# Patient Record
Sex: FEMALE | Race: WHITE | NOT HISPANIC OR LATINO | Employment: PART TIME
[De-identification: names, ages, dates, MRNs, and addresses within clinical notes are randomized per-mention and may not be internally consistent; named-entity substitution may affect disease eponyms.]

---

## 2017-09-17 ENCOUNTER — HEALTH MAINTENANCE LETTER (OUTPATIENT)
Age: 32
End: 2017-09-17

## 2019-11-08 ENCOUNTER — HEALTH MAINTENANCE LETTER (OUTPATIENT)
Age: 34
End: 2019-11-08

## 2020-02-23 ENCOUNTER — HEALTH MAINTENANCE LETTER (OUTPATIENT)
Age: 35
End: 2020-02-23

## 2020-03-02 ENCOUNTER — ANCILLARY PROCEDURE (OUTPATIENT)
Dept: GENERAL RADIOLOGY | Facility: CLINIC | Age: 35
End: 2020-03-02
Attending: NURSE PRACTITIONER
Payer: COMMERCIAL

## 2020-03-02 ENCOUNTER — OFFICE VISIT (OUTPATIENT)
Dept: FAMILY MEDICINE | Facility: CLINIC | Age: 35
End: 2020-03-02
Payer: COMMERCIAL

## 2020-03-02 VITALS
TEMPERATURE: 98 F | WEIGHT: 150.9 LBS | SYSTOLIC BLOOD PRESSURE: 100 MMHG | DIASTOLIC BLOOD PRESSURE: 67 MMHG | HEIGHT: 69 IN | BODY MASS INDEX: 22.35 KG/M2 | OXYGEN SATURATION: 98 % | HEART RATE: 87 BPM

## 2020-03-02 DIAGNOSIS — S59.902A INJURY OF LEFT ELBOW, INITIAL ENCOUNTER: ICD-10-CM

## 2020-03-02 DIAGNOSIS — S50.02XA CONTUSION OF LEFT ELBOW, INITIAL ENCOUNTER: Primary | ICD-10-CM

## 2020-03-02 DIAGNOSIS — M25.552 HIP PAIN, LEFT: ICD-10-CM
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
## 2020-03-02 PROCEDURE — 73080 X-RAY EXAM OF ELBOW: CPT | Mod: LT | Performed by: RADIOLOGY

## 2020-03-02 PROCEDURE — 99203 OFFICE O/P NEW LOW 30 MIN: CPT | Performed by: NURSE PRACTITIONER

## 2020-03-02 ASSESSMENT — ENCOUNTER SYMPTOMS
ARTHRALGIAS: 1
VOMITING: 0
MYALGIAS: 1
NAUSEA: 0
WOUND: 1
NUMBNESS: 0

## 2020-03-02 ASSESSMENT — MIFFLIN-ST. JEOR: SCORE: 1448.86

## 2020-03-03 NOTE — PROGRESS NOTES
"SUBJECTIVE:   April Toure is a 34 year old female presenting with a chief complaint of   Chief Complaint   Patient presents with     Elbow Pain     slipped on ice this afternoon landed on left elbow/side        She is a new patient of [facility].    MS Injury/Pain    Onset of symptoms was this morning.  Location: left elbow  Context:       The injury happened while walking on the sidewalk      Mechanism: fell on ice, hit left elbow and hip      Patient experienced immediate pain, immediate swelling  Course of symptoms is worsening.    Severity moderate  Current and Associated symptoms: Pain, Swelling and Tenderness  Denies  Bruising, Warmth and Redness  Aggravating Factors: pulling up towards her and extending to long.   Therapies to improve symptoms include: made a sling for herself, ibuprofen.   This is the first time this type of problem has occurred for this patient.   Denies hitting head.     Review of Systems   Gastrointestinal: Negative for nausea and vomiting.   Musculoskeletal: Positive for arthralgias and myalgias.   Skin: Positive for wound.   Neurological: Negative for numbness.       History reviewed. No pertinent past medical history.  Family History   Problem Relation Age of Onset     Cancer Mother         Pancreatic     Cancer Paternal Grandmother         stomach     Heart Disease Maternal Grandfather         MI     Current Outpatient Medications   Medication Sig Dispense Refill     Norethin-Eth Estrad-Fe Biphas (LO LOESTRIN FE PO) Take  by mouth.       Social History     Tobacco Use     Smoking status: Never Smoker     Smokeless tobacco: Never Used   Substance Use Topics     Alcohol use: Yes     Comment: occ       OBJECTIVE  /67   Pulse 87   Temp 98  F (36.7  C) (Oral)   Ht 1.753 m (5' 9\")   Wt 68.4 kg (150 lb 14.4 oz)   SpO2 98%   BMI 22.28 kg/m      Physical Exam  Vitals signs and nursing note reviewed.   Constitutional:       Appearance: Normal appearance. She is well-developed and " well-groomed.   Musculoskeletal:      Left elbow: She exhibits decreased range of motion and swelling. Tenderness found. Olecranon process tenderness noted. No radial head, no medial epicondyle and no lateral epicondyle tenderness noted.      Left hip: She exhibits tenderness and swelling. She exhibits normal range of motion.   Skin:     General: Skin is warm and dry.      Capillary Refill: Capillary refill takes less than 2 seconds.      Findings: Bruising (left hip) present.   Neurological:      Mental Status: She is alert and oriented to person, place, and time.      GCS: GCS eye subscore is 4. GCS verbal subscore is 5. GCS motor subscore is 6.   Psychiatric:         Behavior: Behavior is cooperative.         X-Ray was done, my findings are: no acute fractures or dislocations noted to the left elbow xray.     ASSESSMENT:      ICD-10-CM    1. Contusion of left elbow, initial encounter S50.02XA    2. Injury of left elbow, initial encounter S59.902A XR Elbow Left G/E 3 Views   3. Fall, initial encounter W19.XXXA    4. Hip pain, left M25.552         Medical Decision Making:    Differential Diagnosis:  MS Injury Pain: sprain, fracture, muscle strain, contusion and dislocation    Serious Comorbid Conditions:  Adult:  None    PLAN:  Discussed with patient do not feel as though we need to do a hip xray given history and exam. Will do a left elbow xray to ensure no fracture. Xray showed no acute fractures or dislocations. Favor a left elbow contusion v sprain. Educated they are treated very similarly. Symptomatic treatment recommendations discussed. Education was added to AVS. Patient was agreeable to plan and verbalized understanding.      Followup:    If not improving in 1 week or if condition worsens, follow up with your Primary Care Provider    Patient Instructions     Tylenol and ibuprofen to help with pain  Ice to help with pain and swelling      RICE    RICE stands for rest, ice, compression, and elevation. Doing  these things helps limit pain and swelling after an injury. RICE also helps injuries heal faster. Use RICE for sprains, strains, and severe bruises or bumps. Follow the tips on this handout and begin RICE as soon as possible after an injury.  Rest  Pain is your body s way of telling you to rest an injured area. Whether you have hurt an elbow, hand, foot, or knee, limiting its use will prevent further injury and help you heal.  Ice  Applying ice right after an injury helps prevent swelling and reduce pain. Don t place ice directly on your skin.    Wrap a cold pack or bag of ice in a thin cloth. Place it over the injured area.    Ice for 10 minutes every 3 hours. Don t ice for more than 20 minutes at a time.  Compression  Putting pressure (compression) on an injury helps prevent swelling and provides support.    Wrap the injured area firmly with an elastic bandage. If your hand or foot tingles, becomes discolored, or feels cold to the touch, the bandage may be too tight. Rewrap it more loosely.    If your bandage becomes too loose, rewrap it.    Do not wear an elastic bandage overnight.  Elevation  Keeping an injury elevated helps reduce swelling, pain, and throbbing. Elevation is most effective when the injury is kept elevated higher than the heart.     Call your healthcare provider if you notice any of the following:    Fingers or toes feel numb, are cold to the touch, or change color.    Skin looks shiny or tight.    Pain, swelling, or bruising worsens and is not improved with elevation.   Date Last Reviewed: 9/3/2015    1466-9816 [employer]. [address] [place] [address], [place] [address]. All rights reserved. This information is not intended as a substitute for professional medical care. Always follow your healthcare professional's instructions.    How does ice massage therapy provide pain relief?   Ice massages can help provide relief for sore joints and muscles in a number of ways, including:  Ice  "application slows the inflammation and swelling that occurs after injury. Most pain is accompanied by some type of inflammation, and addressing the inflammation helps reduce the pain.   Ice massage therapy numbs sore tissues (providing pain relief like a local anesthetic).  Ice massage therapy slows the nerve impulses in the area, which interrupts the pain-spasm reaction between the nerves.  Ice massage therapy decreases tissue damage.  When does ice massage therapy work the best?  Ice massage therapy is most effective if it is applied as soon as possible after the injury occurs. The cold makes the veins in the tissue contract, reducing circulation. Once the cold is removed, the veins overcompensate and dilate and blood rushes into the area. The blood brings with it the necessary nutrients to allow the injured muscles, ligaments and tendons to heal.      Ice Massage Guidelines:  You can use an ice cube, but it's easier to use an \"ice cup\" for ice massage.  To make an ice cup, fill a small paper or Styrofoam cup about two-thirds full, and freeze it until it is solid.  To use the ice cup, peel off the top of the cup so about  Half  inch of ice is showing. The remaining part of the cup is for you to hold on to.    Keep the ice cup moving (to avoid an ice burn)!   As the ice melts, it will drip, so put a small towel under the area you are icing.    Rub the ice in small circles all over the affected area. Avoid going directly over areas where the bone is close to the skin, such as right over your knee cap, the point of your elbow, or your spine. Try to work just around these bony areas to target the joint space.    If the ice melts down so the cup is touching your skin, peel more of the cup off.    Continue for only 5 to 7 minutes. The area will feel cold at first, then it will ache, then finally become numb. Your skin will be pink and cold when you finish.    You can do an ice massage several times a day if it helps " you.

## 2020-03-03 NOTE — PATIENT INSTRUCTIONS
Tylenol and ibuprofen to help with pain  Ice to help with pain and swelling      RICE    RICE stands for rest, ice, compression, and elevation. Doing these things helps limit pain and swelling after an injury. RICE also helps injuries heal faster. Use RICE for sprains, strains, and severe bruises or bumps. Follow the tips on this handout and begin RICE as soon as possible after an injury.  Rest  Pain is your body s way of telling you to rest an injured area. Whether you have hurt an elbow, hand, foot, or knee, limiting its use will prevent further injury and help you heal.  Ice  Applying ice right after an injury helps prevent swelling and reduce pain. Don t place ice directly on your skin.    Wrap a cold pack or bag of ice in a thin cloth. Place it over the injured area.    Ice for 10 minutes every 3 hours. Don t ice for more than 20 minutes at a time.  Compression  Putting pressure (compression) on an injury helps prevent swelling and provides support.    Wrap the injured area firmly with an elastic bandage. If your hand or foot tingles, becomes discolored, or feels cold to the touch, the bandage may be too tight. Rewrap it more loosely.    If your bandage becomes too loose, rewrap it.    Do not wear an elastic bandage overnight.  Elevation  Keeping an injury elevated helps reduce swelling, pain, and throbbing. Elevation is most effective when the injury is kept elevated higher than the heart.     Call your healthcare provider if you notice any of the following:    Fingers or toes feel numb, are cold to the touch, or change color.    Skin looks shiny or tight.    Pain, swelling, or bruising worsens and is not improved with elevation.   Date Last Reviewed: 9/3/2015    [id number] [employer]. [address] [place] [address], [place] [address]. All rights reserved. This information is not intended as a substitute for professional medical care. Always follow your healthcare professional's instructions.    How does  "ice massage therapy provide pain relief?   Ice massages can help provide relief for sore joints and muscles in a number of ways, including:  Ice application slows the inflammation and swelling that occurs after injury. Most pain is accompanied by some type of inflammation, and addressing the inflammation helps reduce the pain.   Ice massage therapy numbs sore tissues (providing pain relief like a local anesthetic).  Ice massage therapy slows the nerve impulses in the area, which interrupts the pain-spasm reaction between the nerves.  Ice massage therapy decreases tissue damage.  When does ice massage therapy work the best?  Ice massage therapy is most effective if it is applied as soon as possible after the injury occurs. The cold makes the veins in the tissue contract, reducing circulation. Once the cold is removed, the veins overcompensate and dilate and blood rushes into the area. The blood brings with it the necessary nutrients to allow the injured muscles, ligaments and tendons to heal.      Ice Massage Guidelines:  You can use an ice cube, but it's easier to use an \"ice cup\" for ice massage.  To make an ice cup, fill a small paper or Styrofoam cup about two-thirds full, and freeze it until it is solid.  To use the ice cup, peel off the top of the cup so about  Half  inch of ice is showing. The remaining part of the cup is for you to hold on to.    Keep the ice cup moving (to avoid an ice burn)!   As the ice melts, it will drip, so put a small towel under the area you are icing.    Rub the ice in small circles all over the affected area. Avoid going directly over areas where the bone is close to the skin, such as right over your knee cap, the point of your elbow, or your spine. Try to work just around these bony areas to target the joint space.    If the ice melts down so the cup is touching your skin, peel more of the cup off.    Continue for only 5 to 7 minutes. The area will feel cold at first, then it will " ache, then finally become numb. Your skin will be pink and cold when you finish.    You can do an ice massage several times a day if it helps you.

## 2020-03-16 ENCOUNTER — OFFICE VISIT (OUTPATIENT)
Dept: FAMILY MEDICINE | Facility: CLINIC | Age: 35
End: 2020-03-16
Payer: COMMERCIAL

## 2020-03-16 VITALS
WEIGHT: 148.7 LBS | BODY MASS INDEX: 22.02 KG/M2 | TEMPERATURE: 97.4 F | HEIGHT: 69 IN | DIASTOLIC BLOOD PRESSURE: 70 MMHG | OXYGEN SATURATION: 99 % | SYSTOLIC BLOOD PRESSURE: 98 MMHG | HEART RATE: 80 BPM

## 2020-03-16 DIAGNOSIS — M25.522 LEFT ELBOW PAIN: Primary | ICD-10-CM

## 2020-03-16 DIAGNOSIS — T14.8XXA HEMATOMA: ICD-10-CM

## 2020-03-16 PROCEDURE — 99213 OFFICE O/P EST LOW 20 MIN: CPT | Performed by: PHYSICIAN ASSISTANT

## 2020-03-16 ASSESSMENT — MIFFLIN-ST. JEOR: SCORE: 1438.88

## 2020-03-16 NOTE — PROGRESS NOTES
"SUBJECTIVE:  April is a 34 year old female who presents to urgent care with continued left elbow pain.  She was seen after a fall when she slipped on ice 2 weeks ago.  She has had improvement of range of motion and pain but still has some residual swelling and pain with terminal flexion and extension of the left elbow.  Is still swollen.  She denies any other fevers, chills and feels otherwise well  Chief Complaint   Patient presents with     Elbow Pain     left elbow      ROS: See HPI    Current Outpatient Medications   Medication     Norethin-Eth Estrad-Fe Biphas (LO LOESTRIN FE PO)     No current facility-administered medications for this visit.       Patient Active Problem List   Diagnosis     CARDIOVASCULAR SCREENING; LDL GOAL LESS THAN 160        No past medical history on file.  No past surgical history on file.  Family History   Problem Relation Age of Onset     Cancer Mother         Pancreatic     Cancer Paternal Grandmother         stomach     Heart Disease Maternal Grandfather         MI     Social History     Tobacco Use     Smoking status: Never Smoker     Smokeless tobacco: Never Used   Substance Use Topics     Alcohol use: Yes     Comment: occ        OBJECTIVE:  BP 98/70   Pulse 80   Temp 97.4  F (36.3  C) (Oral)   Ht 1.753 m (5' 9\")   Wt 67.4 kg (148 lb 11.2 oz)   SpO2 99%   BMI 21.96 kg/m       GENERAL APPEARANCE: healthy, alert and no distress     EYES: EOMI, - PERRL     HENT: ear canals and TM's normal and nose and mouth without ulcers or lesions     RESP: lungs clear to auscultation - no rales, rhonchi or wheezes     MS:  No obvious deformity shoulder dislocation of the left elbow.  Good range of motion except unable to fully extend or flex elbow.  Swelling proximal to olecranon process likely hematoma.  No bruising or discoloration.  Full strength.  No pain with supination or pronation.  Neurovascular intact     SKIN: no suspicious lesions or rashes     NEURO: Alert and oriented x3.  No " focal deficits.  Upper and lower strength 5/5     PSYCH: mentation appears normal. and affect normal/bright      ASSESSMENT/PLAN:  April was seen today for elbow pain.    Diagnoses and all orders for this visit:    Left elbow pain  -     ORTHOPEDICS ADULT REFERRAL    Hematoma    Patient is likely experiencing pain with terminal flexion extension due to residual hematoma.  Overall she is improving just not as fast as she would like.  Discussed expected timeframes of hematoma and soft tissue injury and can last several weeks.  Restart icing, take ibuprofen for pain and apply heat to the hematoma.  Orthopedic referral given if patient is not improving with these measures.  No x-rays needed today    The plan of care was discussed with the patient. They understand and agree with the course of treatment prescribed. A printed summary was given including instructions and medications.    Herminio West PA-C

## 2020-03-16 NOTE — PATIENT INSTRUCTIONS
Patient Education     Hematoma  A hematoma is a collection of blood trapped outside of a blood vessel. It is what we think of as a bruise or a contusion. It is usually seen under the skin as a black and blue spot on your arm or leg, or a bump on your head after an injury. It can be almost anywhere on or in your body. It can also occur in an internal organ where it can be more serious.  A hematoma is caused by an injury with damage to small blood vessels. This causes blood to leak into the tissues. Blood forms a pocket under the skin that swells and looks like a purplish patch. Hematomas sometimes form under the skin from bleeding during childbirth and can be particularly serious. Another serious form of hematoma forms after a fall on the head, called a subdural hematoma.  Gradually the blood in the hematoma is absorbed back into the body. The swelling and pain of the hematoma will go away. This takes from 1 to 4 weeks, depending on the size of the hematoma. The skin over the hematoma may turn bluish then brown and yellow as the blood is dissolved and absorbed. Usually, this only takes a couple of weeks but can last months.  Home care    Limit motion of the joints near the hematoma. If the hematoma is large and painful, avoid sports and other vigorous physical activity until the swelling and pain goes away.    Apply an ice pack (ice cubes in a plastic bag, or a frozen bag of peas, wrapped in a thin towel) over the injured area for 20 minutes every 1 to 2 hours the first day. Continue with ice packs 3 to 4 times a day for the next 2 days. Continue the use of ice packs for relief of pain and swelling as needed.    If you need anything for pain, you can take acetaminophen, unless you were given a different pain medicine to use. Talk with your healthcare provider before using this medicine if you have chronic liver or kidney disease. Also talk with your healthcare provider if you have had a stomach ulcer or digestive  tract bleeding, or are taking blood-thinner medicines.  Follow-up care  Follow up with your healthcare provider, or as advised. If X-rays or a CT scan were done, you will be notified if there is a change in the reading, especially if it affects treatment.  When to seek medical advice  Call your healthcare provider right away if any of the following occur:    Redness around the hematoma    Increase in pain or warmth in the hematoma    Increase in size of the hematoma    Fever of 100.4 F (38 C) or higher, or as directed by your healthcare provider    If the hematoma is on the arm or leg, watch for:  ? Increased swelling or pain in the extremity  ? Numbness or tingling or blue color of the hand or foot  Date Last Reviewed: 4/1/2018 2000-2019 [employer] [address] [place] [address], [place] [address]. All rights reserved. This information is not intended as a substitute for professional medical care. Always follow your healthcare professional's instructions.

## 2020-12-06 ENCOUNTER — HEALTH MAINTENANCE LETTER (OUTPATIENT)
Age: 35
End: 2020-12-06

## 2021-06-09 ENCOUNTER — TELEPHONE (OUTPATIENT)
Dept: OTOLARYNGOLOGY | Facility: CLINIC | Age: 36
End: 2021-06-09

## 2021-06-09 ENCOUNTER — OFFICE VISIT (OUTPATIENT)
Dept: URGENT CARE | Facility: URGENT CARE | Age: 36
End: 2021-06-09
Payer: COMMERCIAL

## 2021-06-09 VITALS
TEMPERATURE: 98.4 F | WEIGHT: 150 LBS | HEIGHT: 69 IN | BODY MASS INDEX: 22.22 KG/M2 | OXYGEN SATURATION: 97 % | SYSTOLIC BLOOD PRESSURE: 110 MMHG | DIASTOLIC BLOOD PRESSURE: 60 MMHG | RESPIRATION RATE: 15 BRPM | HEART RATE: 58 BPM

## 2021-06-09 DIAGNOSIS — R42 VERTIGO: Primary | ICD-10-CM

## 2021-06-09 PROCEDURE — 99214 OFFICE O/P EST MOD 30 MIN: CPT | Performed by: INTERNAL MEDICINE

## 2021-06-09 RX ORDER — MECLIZINE HYDROCHLORIDE 25 MG/1
25 TABLET ORAL 3 TIMES DAILY PRN
Qty: 30 TABLET | Refills: 0 | Status: SHIPPED | OUTPATIENT
Start: 2021-06-09 | End: 2022-11-11

## 2021-06-09 ASSESSMENT — ENCOUNTER SYMPTOMS
PARESTHESIAS: 0
FACIAL ASYMMETRY: 0
TROUBLE SWALLOWING: 0
WEAKNESS: 0
FEVER: 0
SPEECH DIFFICULTY: 0

## 2021-06-09 ASSESSMENT — MIFFLIN-ST. JEOR: SCORE: 1439.78

## 2021-06-09 NOTE — TELEPHONE ENCOUNTER
[facility] Call Center    Phone Message    May a detailed message be left on voicemail: no     Reason for Call: Appointment Intake    Referring Provider Name: Rupa Cabrera MD in  URGENT CARE  Diagnosis and/or Symptoms: Vertigo    Action Taken: Message routed to:  [facility] ([facility]): [facility]- [[id number]]    Travel Screening: Not Applicable

## 2021-06-09 NOTE — PROGRESS NOTES
"ASSESSMENT AND PLAN:      ICD-10-CM    1. Vertigo  R42 OTOLARYNGOLOGY REFERRAL     meclizine (ANTIVERT) 25 MG tablet       Differential Diagnosis:  Vertigo, labyrinthitis, Ménière's, stroke vertebrobasilar lesion  Serious Comorbid Conditions:  Adult:  None    PLAN:      Patient Instructions   Vertigo  No signs/symptoms of stroke    Unclear etiology of vertigo.  Question if allergy related whether barometric pressure or temperature changes changes.  No obvious viral symptoms at this time    Otolaryngology referral to request Epley maneuver  Meclizine up to 3 x day as needed.    Fluids  Rest.  Avoid driving.    If symptoms persist 1 month, then see Otolaryngology     Return in about 2 days (around 6/11/2021).    Rupa Cabrera MD  [facility] URGENT CARE    Subjective     April Toure is a 35 year old who presents for Patient presents with:  Urgent Care  Dizziness: vertigo x 3 days. Had it 2 weekends ago as well.     an established patient of [facility].    Adult    Onset of symptoms was 3 day(s) ago.  Sudden onset while sitting at home knitting.  Vertigo symptoms described, \"room spinning\"  \"like drunk\" - lasted 1.5 hours.  Next day at work, had symptoms, aggravated by bending & standing  Exacerbated by moving head  Not affected driving  Touching walls when walking  Treatment measures tried include Fluids.  Predisposing factors include - no recent illness  has seasonal allergies with stuffy ears.        Review of Systems   Constitutional: Negative for fever.        Feels warm   HENT: Negative for hearing loss, tinnitus and trouble swallowing.    Eyes: Negative for visual disturbance.   Neurological: Negative for facial asymmetry, speech difficulty, weakness (no focal weakness) and paresthesias.        No sx CVA           Objective    /60   Pulse 58   Temp 98.4  F (36.9  C) (Oral)   Resp 15   Ht 1.753 m (5' 9\")   Wt 68 kg (150 lb)   LMP 06/02/2021   SpO2 97%   Breastfeeding No   BMI " 22.15 kg/m    Physical Exam  Vitals signs reviewed.   Constitutional:       General: She is not in acute distress.     Appearance: Normal appearance. She is not ill-appearing, toxic-appearing or diaphoretic.   HENT:      Right Ear: Tympanic membrane normal.      Left Ear: Tympanic membrane normal.      Ears:      Comments:  finger rub finger hearing bilaterally     Nose: Nose normal.      Mouth/Throat:      Mouth: Mucous membranes are moist.   Eyes:      Extraocular Movements: Extraocular movements intact.      Conjunctiva/sclera: Conjunctivae normal.      Pupils: Pupils are equal, round, and reactive to light.      Comments: No nystagmus   Cardiovascular:      Rate and Rhythm: Normal rate and regular rhythm.      Pulses: Normal pulses.      Heart sounds: Normal heart sounds.   Pulmonary:      Effort: Pulmonary effort is normal.      Breath sounds: Normal breath sounds.   Neurological:      General: No focal deficit present.      Mental Status: She is alert. Mental status is at baseline.      Cranial Nerves: No cranial nerve deficit.      Sensory: No sensory deficit.      Motor: No weakness.      Gait: Gait normal.      Deep Tendon Reflexes: Reflexes normal.   Psychiatric:         Mood and Affect: Mood normal.

## 2021-06-09 NOTE — PATIENT INSTRUCTIONS
Vertigo  No signs/symptoms of stroke    Unclear etiology of vertigo.  Question if allergy related whether barometric pressure or temperature changes changes.  No obvious viral symptoms at this time    Otolaryngology referral to request Epley maneuver  Meclizine up to 3 x day as needed.    Fluids  Rest.  Avoid driving.    If symptoms persist 1 month, then see Otolaryngology

## 2021-09-25 ENCOUNTER — HEALTH MAINTENANCE LETTER (OUTPATIENT)
Age: 36
End: 2021-09-25

## 2022-01-15 ENCOUNTER — HEALTH MAINTENANCE LETTER (OUTPATIENT)
Age: 37
End: 2022-01-15

## 2022-07-05 ENCOUNTER — OFFICE VISIT (OUTPATIENT)
Dept: URGENT CARE | Facility: URGENT CARE | Age: 37
End: 2022-07-05
Payer: COMMERCIAL

## 2022-07-05 VITALS
OXYGEN SATURATION: 99 % | TEMPERATURE: 98.2 F | DIASTOLIC BLOOD PRESSURE: 72 MMHG | SYSTOLIC BLOOD PRESSURE: 119 MMHG | HEIGHT: 69 IN | HEART RATE: 90 BPM | WEIGHT: 160 LBS | BODY MASS INDEX: 23.7 KG/M2

## 2022-07-05 DIAGNOSIS — R14.0 BLOATING SYMPTOM: Primary | ICD-10-CM

## 2022-07-05 DIAGNOSIS — R10.84 ABDOMINAL PAIN, GENERALIZED: ICD-10-CM

## 2022-07-05 LAB
BASOPHILS # BLD AUTO: 0.1 10E3/UL (ref 0–0.2)
BASOPHILS NFR BLD AUTO: 0 %
EOSINOPHIL # BLD AUTO: 0.2 10E3/UL (ref 0–0.7)
EOSINOPHIL NFR BLD AUTO: 2 %
ERYTHROCYTE [DISTWIDTH] IN BLOOD BY AUTOMATED COUNT: 12 % (ref 10–15)
HCT VFR BLD AUTO: 43.2 % (ref 35–47)
HGB BLD-MCNC: 15 G/DL (ref 11.7–15.7)
IMM GRANULOCYTES # BLD: 0 10E3/UL
IMM GRANULOCYTES NFR BLD: 0 %
LYMPHOCYTES # BLD AUTO: 2.1 10E3/UL (ref 0.8–5.3)
LYMPHOCYTES NFR BLD AUTO: 18 %
MCH RBC QN AUTO: 30.8 PG (ref 26.5–33)
MCHC RBC AUTO-ENTMCNC: 34.7 G/DL (ref 31.5–36.5)
MCV RBC AUTO: 89 FL (ref 78–100)
MONOCYTES # BLD AUTO: 0.8 10E3/UL (ref 0–1.3)
MONOCYTES NFR BLD AUTO: 6 %
NEUTROPHILS # BLD AUTO: 8.9 10E3/UL (ref 1.6–8.3)
NEUTROPHILS NFR BLD AUTO: 74 %
PLATELET # BLD AUTO: 311 10E3/UL (ref 150–450)
RBC # BLD AUTO: 4.87 10E6/UL (ref 3.8–5.2)
WBC # BLD AUTO: 12.1 10E3/UL (ref 4–11)

## 2022-07-05 PROCEDURE — 85025 COMPLETE CBC W/AUTO DIFF WBC: CPT | Performed by: FAMILY MEDICINE

## 2022-07-05 PROCEDURE — 36415 COLL VENOUS BLD VENIPUNCTURE: CPT | Performed by: FAMILY MEDICINE

## 2022-07-05 PROCEDURE — 99214 OFFICE O/P EST MOD 30 MIN: CPT | Performed by: FAMILY MEDICINE

## 2022-07-05 RX ORDER — LORATADINE 10 MG/1
10 TABLET ORAL DAILY
COMMUNITY

## 2022-07-05 NOTE — PROGRESS NOTES
Chief Complaint   Patient presents with     Urgent Care     Gas     Bloated     Nausea     Pt in clinic to have eval for bloating, gas and nausea.       ASSESMENT AND PLAN   April was seen today for urgent care, gas, bloated and nausea.    Diagnoses and all orders for this visit:    Bloating symptom  -     XR Abdomen 2 Views  -     Enteric Bacteria and Virus Panel by HEATHER Stool; Future  -     Cancel: STOOL CX O&P INFORMATION  -     Ova and Parasite Exam Routine; Future    Abdominal pain, generalized  -     CBC with platelets and differential; Future  -     CBC with platelets and differential        Discussed with pt to try beano to help with the symptoms   If symptoms worsen and with slight elevated wbc should follow up in ER       Initial differential diagnosis included but was not restricted to   Differential Diagnosis:  Abdominal Pain: Constipation, GB/Cholelithiasis, IBS, UTI, Pyelonephritis, Abdominal Wall, Bowel Obstruction, Pancreatitis, Hepatitis, Non Specific and Viral Gastroenteritis  Medical Decision Making:  Patient has has normal bowel movements regularly and also bowel sounds are normal I am not concerned about acute bowel obstruction.  Symptoms  could be from intestinal infection.  Recommended getting a stool test done.  Follow up in ER tomorrow or tonight if discomfort worsens     Routine discharge counseling was given to the patient and the patient understands that worsening, changing or persistent symptoms should prompt an immediate call or follow up with their primary physician or the emergency department. The importance of appropriate follow up was also discussed with the patient.     I have reviewed the nursing notes.  Review of the result(s) of each unique test -   X-Ray was done, my findings are: no sign of bowel obstruction     Time  spent on the date of the encounter doing chart review, review of test results, interpretation of tests, patient visit and documentation   see orders in Epic  Pt  verbalized and agreed with the plan and is aware of the worsening symptoms for which would need to follow up .  Pt was stable during time of discharge from the clinic     SUBJECTIVE     April Toure is a 37 year old female presenting with a chief complaint of    Chief Complaint   Patient presents with     Urgent Care     Gas     Bloated     Nausea     Pt in clinic to have eval for bloating, gas and nausea.     Abdominal Pain    Location: suprapubic and periumbilical   Radiation: None.    Pain character: dull,   Duration: 3 day(s)   Course of Illness: stable.  Exacerbated by: recumbency  Relieved by: nothing.  Associated Symptoms: nausea and bloating.  sHe also has been noticing several bowel movements which are normal with no mucus or blood in the stool.  Female : Not applicable  Surgical History: appendectomy            Past Medical History:   Diagnosis Date     Fatty liver      Current Outpatient Medications   Medication Sig Dispense Refill     bismuth subsalicylate (PEPTO BISMOL) 262 MG/15ML suspension Take 15 mLs by mouth every 6 hours as needed for indigestion       loratadine (CLARITIN) 10 MG tablet Take 10 mg by mouth daily       Multiple Vitamin (MULTIVITAMIN ADULT PO)        Probiotic Product (PROBIOTIC DAILY PO)        meclizine (ANTIVERT) 25 MG tablet Take 1 tablet (25 mg) by mouth 3 times daily as needed for dizziness (Patient not taking: Reported on 7/5/2022) 30 tablet 0     Norethin-Eth Estrad-Fe Biphas (LO LOESTRIN FE PO) Take  by mouth. (Patient not taking: Reported on 7/5/2022)       Social History     Tobacco Use     Smoking status: Never Smoker     Smokeless tobacco: Never Used   Substance Use Topics     Alcohol use: Yes     Comment: occ     Family History   Problem Relation Age of Onset     Cancer Mother         Pancreatic     Cancer Paternal Grandmother         stomach     Heart Disease Maternal Grandfather         MI         ROS:    10 point ROS of systems including Constitutional, Eyes,  "Respiratory, Cardiovascular,  Genitourinary, Integumentary, Muscularskeletal, Psychiatric ,neurological were all negative except for pertinent positives noted in my HPI         OBJECTIVE:    /72   Pulse 90   Temp 98.2  F (36.8  C) (Temporal)   Ht 1.753 m (5' 9\")   Wt 72.6 kg (160 lb)   LMP 06/21/2022   SpO2 99%   BMI 23.63 kg/m    GENERAL APPEARANCE: healthy, alert and no distress  EYES: EOMI,  PERRL, conjunctiva clear  mouth without ulcers, erythema or lesions  CV: regular rates and rhythm, normal S1 S2, no murmur noted  ABDOMEN:  soft, nontender,distended  no HSM or masses and bowel sounds normal  PSYCH: mentation appears normal  Physical Exam      (Note was completed, in part, with SHADOW voice-recognition software. Documentation reviewed, but some grammatical, spelling, and word errors may remain.)  Celine Minaya MD                  "

## 2022-07-06 ENCOUNTER — LAB (OUTPATIENT)
Dept: LAB | Facility: CLINIC | Age: 37
End: 2022-07-06
Payer: COMMERCIAL

## 2022-07-06 ENCOUNTER — TELEPHONE (OUTPATIENT)
Dept: URGENT CARE | Facility: URGENT CARE | Age: 37
End: 2022-07-06

## 2022-07-06 DIAGNOSIS — R14.0 BLOATING SYMPTOM: ICD-10-CM

## 2022-07-06 PROCEDURE — 87209 SMEAR COMPLEX STAIN: CPT

## 2022-07-06 PROCEDURE — 87177 OVA AND PARASITES SMEARS: CPT

## 2022-07-06 PROCEDURE — 87506 IADNA-DNA/RNA PROBE TQ 6-11: CPT

## 2022-07-06 NOTE — TELEPHONE ENCOUNTER
Pt dropped off lab specimens and states she could not send a MyChart msg to Dr. Minaya.     Pt states she is not improving or getting worse. Would like to know what her next steps are. Requesting a return call [phone].

## 2022-07-07 LAB — O+P STL MICRO: NEGATIVE

## 2022-11-11 ENCOUNTER — OFFICE VISIT (OUTPATIENT)
Dept: FAMILY MEDICINE | Facility: CLINIC | Age: 37
End: 2022-11-11
Payer: COMMERCIAL

## 2022-11-11 VITALS
HEART RATE: 81 BPM | OXYGEN SATURATION: 99 % | BODY MASS INDEX: 24.73 KG/M2 | DIASTOLIC BLOOD PRESSURE: 79 MMHG | WEIGHT: 167 LBS | SYSTOLIC BLOOD PRESSURE: 127 MMHG | HEIGHT: 69 IN | TEMPERATURE: 97.3 F | RESPIRATION RATE: 16 BRPM

## 2022-11-11 DIAGNOSIS — Z12.4 CERVICAL CANCER SCREENING: ICD-10-CM

## 2022-11-11 DIAGNOSIS — Z76.89 ENCOUNTER TO ESTABLISH CARE WITH NEW DOCTOR: ICD-10-CM

## 2022-11-11 DIAGNOSIS — Z11.59 NEED FOR HEPATITIS C SCREENING TEST: ICD-10-CM

## 2022-11-11 DIAGNOSIS — L98.9 SKIN LESION: ICD-10-CM

## 2022-11-11 DIAGNOSIS — K76.0 NAFL (NONALCOHOLIC FATTY LIVER): ICD-10-CM

## 2022-11-11 DIAGNOSIS — G43.009 MIGRAINE WITHOUT AURA AND WITHOUT STATUS MIGRAINOSUS, NOT INTRACTABLE: ICD-10-CM

## 2022-11-11 DIAGNOSIS — R19.7 INTERMITTENT DIARRHEA: ICD-10-CM

## 2022-11-11 DIAGNOSIS — Z11.4 SCREENING FOR HIV (HUMAN IMMUNODEFICIENCY VIRUS): ICD-10-CM

## 2022-11-11 DIAGNOSIS — Z00.00 ROUTINE GENERAL MEDICAL EXAMINATION AT A HEALTH CARE FACILITY: Primary | ICD-10-CM

## 2022-11-11 LAB
ALBUMIN SERPL-MCNC: 3.9 G/DL (ref 3.4–5)
ALP SERPL-CCNC: 77 U/L (ref 40–150)
ALT SERPL W P-5'-P-CCNC: 51 U/L (ref 0–50)
ANION GAP SERPL CALCULATED.3IONS-SCNC: 6 MMOL/L (ref 3–14)
AST SERPL W P-5'-P-CCNC: 24 U/L (ref 0–45)
BILIRUB SERPL-MCNC: 0.4 MG/DL (ref 0.2–1.3)
BUN SERPL-MCNC: 10 MG/DL (ref 7–30)
CALCIUM SERPL-MCNC: 9 MG/DL (ref 8.5–10.1)
CHLORIDE BLD-SCNC: 107 MMOL/L (ref 94–109)
CHOLEST SERPL-MCNC: 183 MG/DL
CO2 SERPL-SCNC: 26 MMOL/L (ref 20–32)
CREAT SERPL-MCNC: 0.65 MG/DL (ref 0.52–1.04)
FASTING STATUS PATIENT QL REPORTED: YES
GFR SERPL CREATININE-BSD FRML MDRD: >90 ML/MIN/1.73M2
GLUCOSE BLD-MCNC: 86 MG/DL (ref 70–99)
HCV AB SERPL QL IA: NONREACTIVE
HDLC SERPL-MCNC: 76 MG/DL
HIV 1+2 AB+HIV1 P24 AG SERPL QL IA: NONREACTIVE
LDLC SERPL CALC-MCNC: 93 MG/DL
NONHDLC SERPL-MCNC: 107 MG/DL
POTASSIUM BLD-SCNC: 3.9 MMOL/L (ref 3.4–5.3)
PROT SERPL-MCNC: 7.7 G/DL (ref 6.8–8.8)
SODIUM SERPL-SCNC: 139 MMOL/L (ref 133–144)
TRIGL SERPL-MCNC: 71 MG/DL

## 2022-11-11 PROCEDURE — G0145 SCR C/V CYTO,THINLAYER,RESCR: HCPCS | Performed by: FAMILY MEDICINE

## 2022-11-11 PROCEDURE — 90715 TDAP VACCINE 7 YRS/> IM: CPT | Performed by: FAMILY MEDICINE

## 2022-11-11 PROCEDURE — 87624 HPV HI-RISK TYP POOLED RSLT: CPT | Performed by: FAMILY MEDICINE

## 2022-11-11 PROCEDURE — 90686 IIV4 VACC NO PRSV 0.5 ML IM: CPT | Performed by: FAMILY MEDICINE

## 2022-11-11 PROCEDURE — 90472 IMMUNIZATION ADMIN EACH ADD: CPT | Performed by: FAMILY MEDICINE

## 2022-11-11 PROCEDURE — 86003 ALLG SPEC IGE CRUDE XTRC EA: CPT | Mod: 59 | Performed by: FAMILY MEDICINE

## 2022-11-11 PROCEDURE — 80053 COMPREHEN METABOLIC PANEL: CPT | Performed by: FAMILY MEDICINE

## 2022-11-11 PROCEDURE — 86803 HEPATITIS C AB TEST: CPT | Performed by: FAMILY MEDICINE

## 2022-11-11 PROCEDURE — 86364 TISS TRNSGLTMNASE EA IG CLAS: CPT | Performed by: FAMILY MEDICINE

## 2022-11-11 PROCEDURE — 99213 OFFICE O/P EST LOW 20 MIN: CPT | Mod: 25 | Performed by: FAMILY MEDICINE

## 2022-11-11 PROCEDURE — 87389 HIV-1 AG W/HIV-1&-2 AB AG IA: CPT | Performed by: FAMILY MEDICINE

## 2022-11-11 PROCEDURE — 36415 COLL VENOUS BLD VENIPUNCTURE: CPT | Performed by: FAMILY MEDICINE

## 2022-11-11 PROCEDURE — 80061 LIPID PANEL: CPT | Performed by: FAMILY MEDICINE

## 2022-11-11 PROCEDURE — 90471 IMMUNIZATION ADMIN: CPT | Performed by: FAMILY MEDICINE

## 2022-11-11 PROCEDURE — 99395 PREV VISIT EST AGE 18-39: CPT | Mod: 25 | Performed by: FAMILY MEDICINE

## 2022-11-11 RX ORDER — SUMATRIPTAN 50 MG/1
50 TABLET, FILM COATED ORAL
Qty: 12 TABLET | Refills: 0 | Status: SHIPPED | OUTPATIENT
Start: 2022-11-11 | End: 2023-12-22

## 2022-11-11 ASSESSMENT — ENCOUNTER SYMPTOMS
PARESTHESIAS: 0
SORE THROAT: 0
HEADACHES: 0
JOINT SWELLING: 0
BREAST MASS: 0
COUGH: 0
NERVOUS/ANXIOUS: 0
SHORTNESS OF BREATH: 0
ABDOMINAL PAIN: 0
DYSURIA: 0
NAUSEA: 0
HEARTBURN: 0
HEMATURIA: 0
DIARRHEA: 0
FEVER: 0
WEAKNESS: 0
CONSTIPATION: 0
MYALGIAS: 0
FREQUENCY: 0
HEMATOCHEZIA: 0
CHILLS: 0
PALPITATIONS: 0
EYE PAIN: 0
DIZZINESS: 0
ARTHRALGIAS: 0

## 2022-11-11 NOTE — PROGRESS NOTES
SUBJECTIVE:   CC: April is an 37 year old who presents for preventive health visit.       Patient has been advised of split billing requirements and indicates understanding: Yes  Healthy Habits:     Getting at least 3 servings of Calcium per day:  Yes    Bi-annual eye exam:  Yes    Dental care twice a year:  Yes    Sleep apnea or symptoms of sleep apnea:  None    Diet:  Vegetarian/vegan    Frequency of exercise:  None    Taking medications regularly:  Yes    Medication side effects:  Not applicable    PHQ-2 Total Score: 1    Additional concerns today:  Yes  Imm/Inj  Pertinent negatives include no abdominal pain, arthralgias, chest pain, chills, congestion, coughing, fever, headaches, joint swelling, myalgias, nausea, rash, sore throat or weakness.     1. Spots on head - hurt when wears earphones.  Removed?    2. Migraines.  Since 20s.  They are stress related.  Come through back.   Vertigo - had, saw ENT.  Excedrin migraine helps.    4. Stomach issues, was in urgent care.  Diarrhea with stress.  Also recently after shellfish, could she have allergy?        Today's PHQ-2 Score:   PHQ-2 ( 1999 Pfizer) 11/11/2022   Q1: Little interest or pleasure in doing things 0   Q2: Feeling down, depressed or hopeless 1   PHQ-2 Score 1   Q1: Little interest or pleasure in doing things Not at all   Q2: Feeling down, depressed or hopeless Several days   PHQ-2 Score 1       Abuse: Current or Past (Physical, Sexual or Emotional) - No  Do you feel safe in your environment? Yes    Have you ever done Advance Care Planning? (For example, a Health Directive, POLST, or a discussion with a medical provider or your loved ones about your wishes): No, advance care planning information given to patient to review.  Patient plans to discuss their wishes with loved ones or provider.      Social History     Tobacco Use     Smoking status: Never     Smokeless tobacco: Never   Substance Use Topics     Alcohol use: Yes     Comment: occ          Alcohol Use 11/11/2022   Prescreen: >3 drinks/day or >7 drinks/week? No       Reviewed orders with patient.  Reviewed health maintenance and updated orders accordingly - Yes      Breast Cancer Screening:    Breast CA Risk Assessment (FHS-7) 11/11/2022   Do you have a family history of breast, colon, or ovarian cancer? No / Unknown         Patient under 40 years of age: Routine Mammogram Screening not recommended.   Pertinent mammograms are reviewed under the imaging tab.    History of abnormal Pap smear: NO - age 30-65 PAP every 5 years with negative HPV co-testing recommended     Reviewed and updated as needed this visit by clinical staff   Tobacco  Allergies  Meds  Problems  Med Hx  Surg Hx  Fam Hx  Soc   Hx        Reviewed and updated as needed this visit by Provider   Tobacco  Allergies  Meds  Problems  Med Hx  Surg Hx  Fam Hx             Review of Systems   Constitutional: Negative for chills and fever.   HENT: Negative for congestion, ear pain, hearing loss and sore throat.    Eyes: Negative for pain and visual disturbance.   Respiratory: Negative for cough and shortness of breath.    Cardiovascular: Negative for chest pain, palpitations and peripheral edema.   Gastrointestinal: Negative for abdominal pain, constipation, diarrhea, heartburn, hematochezia and nausea.   Breasts:  Negative for tenderness, breast mass and discharge.   Genitourinary: Negative for dysuria, frequency, genital sores, hematuria, pelvic pain, urgency, vaginal bleeding and vaginal discharge.   Musculoskeletal: Negative for arthralgias, joint swelling and myalgias.   Skin: Negative for rash.   Neurological: Negative for dizziness, weakness, headaches and paresthesias.   Psychiatric/Behavioral: Negative for mood changes. The patient is not nervous/anxious.           OBJECTIVE:   /79 (BP Location: Right arm, Patient Position: Sitting, Cuff Size: Adult Regular)   Pulse 81   Temp 97.3  F (36.3  C) (Tympanic)    "Resp 16   Ht 1.753 m (5' 9\")   Wt 75.8 kg (167 lb)   SpO2 99%   BMI 24.66 kg/m    Physical Exam  GENERAL: healthy, alert and no distress  EYES: Eyes grossly normal to inspection, PERRL and conjunctivae and sclerae normal  HENT: ear canals and TM's normal, nose and mouth without ulcers or lesions  NECK: no adenopathy, no asymmetry, masses, or scars and thyroid normal to palpation  RESP: lungs clear to auscultation - no rales, rhonchi or wheezes  CV: regular rate and rhythm, normal S1 S2, no S3 or S4, no murmur, click or rub, no peripheral edema and peripheral pulses strong  ABDOMEN: soft, nontender, no hepatosplenomegaly, no masses and bowel sounds normal   (female): normal female external genitalia, normal urethral meatus, vaginal mucosa pink, moist, well rugated, and normal cervix/adnexa/uterus without masses or discharge  MS: no gross musculoskeletal defects noted, no edema  SKIN: no suspicious lesions or rashes  NEURO: Normal strength and tone, mentation intact and speech normal  PSYCH: mentation appears normal, affect normal/bright    Diagnostic Test Results:  Labs reviewed in Epic    ASSESSMENT/PLAN:   April was seen today for physical, gyn exam and imm/inj.    Diagnoses and all orders for this visit:    Routine general medical examination at a health care facility    Mammo: start at 40    Pap: done today    Colon: start at 45    Immunizations: Will update FLU and TDAP today    Discussed healthy lifestyle and aging recommendations including regular exercise, adequate and regular sleep, 5+ fruits and veggies daily.  -     Lipid panel reflex to direct LDL Non-fasting; Future  -     Lipid panel reflex to direct LDL Non-fasting    Encounter to establish care with new doctor    New to me today; reviewed and updated past medical history and problem list and allergies via chart review ([facility], Care Everywhere) and with patient.    Screening for HIV (human immunodeficiency virus)  -     HIV Antigen Antibody " Combo; Future  -     HIV Antigen Antibody Combo    Need for hepatitis C screening test  -     Hepatitis C Screen Reflex to HCV RNA Quant and Genotype; Future  -     Hepatitis C Screen Reflex to HCV RNA Quant and Genotype    Cervical cancer screening  -     Pap Screen with HPV - recommended age 30 - 65 years    Skin lesion  Comments:    Top of head, would like referral to derm for removal    Done today  Orders:  -     Adult Dermatology Referral; Future    Intermittent diarrhea  Comments:    Check labs for shellfish allergy and gluten    If normal - suspect reaction to stress/anxiety (which is what pt suspects as well)    let us know if worsens  Orders:  -     Allergen clam IgE; Future  -     Allergen crab IgE; Future  -     Allergen lobster IgE; Future  -     Allergen oyster IgE; Future  -     Allergen scallop IgE; Future  -     Allergen shrimp IgE; Future  -     Tissue transglutaminase colten IgA and IgG; Future  -     Allergen clam IgE  -     Allergen crab IgE  -     Allergen lobster IgE  -     Allergen oyster IgE  -     Allergen scallop IgE  -     Allergen shrimp IgE  -     Tissue transglutaminase colten IgA and IgG    NAFL (nonalcoholic fatty liver)  Comments:    Dx'd at outside clinic    Has improved with diet changes (Mediterranean diet)    Recheck LFTs today  Orders:  -     COMPREHENSIVE METABOLIC PANEL; Future  -     COMPREHENSIVE METABOLIC PANEL    Migraine without aura and without status migrainosus, not intractable  Comments:    Trial of Imitrex    Will be helpful diagnostically and also may improve HA resolution    Follow up via MyC message after tries it let us know if helpful or no  Orders:  -     SUMAtriptan (IMITREX) 50 MG tablet; Take 1 tablet (50 mg) by mouth at onset of headache for migraine May repeat in 2 hours. Max 4 tablets/24 hours.    Other orders  -     REVIEW OF HEALTH MAINTENANCE PROTOCOL ORDERS  -     TDAP VACCINE (Adacel, Boostrix)  -     INFLUENZA VACCINE IM > 6 MONTHS VALENT IIV4  "(AFLURIA/FLUZONE)        Patient has been advised of split billing requirements and indicates understanding: Yes      COUNSELING:  Reviewed preventive health counseling, as reflected in patient instructions    Estimated body mass index is 24.66 kg/m  as calculated from the following:    Height as of this encounter: 1.753 m (5' 9\").    Weight as of this encounter: 75.8 kg (167 lb).        She reports that she has never smoked. She has never used smokeless tobacco.      Counseling Resources:  ATP IV Guidelines  Pooled Cohorts Equation Calculator  Breast Cancer Risk Calculator  BRCA-Related Cancer Risk Assessment: FHS-7 Tool  FRAX Risk Assessment  ICSI Preventive Guidelines  Dietary Guidelines for Americans, 2010  USDA's MyPlate  ASA Prophylaxis  Lung CA Screening    Nichol Montano MD  [facility]  "

## 2022-11-11 NOTE — NURSING NOTE
Prior to immunization administration, verified patients identity using patient s name and date of birth. Please see Immunization Activity for additional information.     Screening Questionnaire for Adult Immunization    Are you sick today?   No   Do you have allergies to medications, food, a vaccine component or latex?   No   Have you ever had a serious reaction after receiving a vaccination?   No   Do you have a long-term health problem with heart, lung, kidney, or metabolic disease (e.g., diabetes), asthma, a blood disorder, no spleen, complement component deficiency, a cochlear implant, or a spinal fluid leak?  Are you on long-term aspirin therapy?   No   Do you have cancer, leukemia, HIV/AIDS, or any other immune system problem?   No   Do you have a parent, brother, or sister with an immune system problem?   No   In the past 3 months, have you taken medications that affect  your immune system, such as prednisone, other steroids, or anticancer drugs; drugs for the treatment of rheumatoid arthritis, Crohn s disease, or psoriasis; or have you had radiation treatments?   No   Have you had a seizure, or a brain or other nervous system problem?   No   During the past year, have you received a transfusion of blood or blood    products, or been given immune (gamma) globulin or antiviral drug?   No   For women: Are you pregnant or is there a chance you could become       pregnant during the next month?   No   Have you received any vaccinations in the past 4 weeks?   No     Immunization questionnaire answers were all negative.        Per orders of Dr. Montano, injection of TDap (Adacel) given by Deidra Virk MA. Patient instructed to remain in clinic for 15 minutes afterwards, and to report any adverse reaction to me immediately.       Screening performed by Deidra Virk MA on 11/11/2022 at 12:50 PM.

## 2022-11-14 LAB
TTG IGA SER-ACNC: 0.6 U/ML
TTG IGG SER-ACNC: <0.6 U/ML

## 2022-11-15 LAB
BKR LAB AP GYN ADEQUACY: NORMAL
BKR LAB AP GYN INTERPRETATION: NORMAL
BKR LAB AP HPV REFLEX: NORMAL
BKR LAB AP PREVIOUS ABNORMAL: NORMAL
CLAM IGE QN: <0.1 KU(A)/L
CRAB IGE QN: <0.1 KU(A)/L
LOBSTER IGE QN: <0.1 KU(A)/L
OYSTER IGE QN: <0.1 KU(A)/L
PATH REPORT.COMMENTS IMP SPEC: NORMAL
PATH REPORT.COMMENTS IMP SPEC: NORMAL
PATH REPORT.RELEVANT HX SPEC: NORMAL
SCALLOP IGE QN: <0.1 KU(A)/L
SHRIMP IGE QN: <0.1 KU(A)/L

## 2022-11-17 LAB
HUMAN PAPILLOMA VIRUS 16 DNA: NEGATIVE
HUMAN PAPILLOMA VIRUS 18 DNA: NEGATIVE
HUMAN PAPILLOMA VIRUS FINAL DIAGNOSIS: NORMAL
HUMAN PAPILLOMA VIRUS OTHER HR: NEGATIVE

## 2023-02-28 ENCOUNTER — E-VISIT (OUTPATIENT)
Dept: FAMILY MEDICINE | Facility: CLINIC | Age: 38
End: 2023-02-28
Payer: COMMERCIAL

## 2023-02-28 DIAGNOSIS — R05.9 COUGH, UNSPECIFIED TYPE: Primary | ICD-10-CM

## 2023-02-28 PROCEDURE — 99421 OL DIG E/M SVC 5-10 MIN: CPT | Performed by: FAMILY MEDICINE

## 2023-03-01 NOTE — PATIENT INSTRUCTIONS
Dear April,      Based on your responses, you may have COVID-19.     Will I be tested for COVID-19?  We would like to test you for COVID. I have placed orders for this test.     To schedule: go to your [facility] home page and scroll down to the section that says  You have an appointment that needs to be scheduled  and click the large green button that says  Schedule Now  and follow the steps to find the next available openings.    If you are unable to complete these [facility] [facility] scheduling steps, please call [phone] to schedule your testing.     How do I self-isolate?  You isolate when you have symptoms of COVID or a test shows you have COVID, even if you don t have symptoms.     If you DO have symptoms:  o Stay home and away from others  - For at least 5 days after your symptoms started, AND   - You are fever free for 24 hours (with no medicine that reduces fever), AND  - Your other symptoms are better.  o Wear a mask for 10 full days any time you are around others.    If you DON T have symptoms:  o Stay at home and away from others for at least 5 days after your positive test.  o Wear a mask for 10 full days any time you are around others.    How can I take care of myself?  Over the counter medications may help with your symptoms such as runny or stuffy nose, cough, chills, or fever.  Talk to your care team about your options.     Some people are at high risk of severe illness (for example, you have a weak immune system, you re 50 years or older, or you have certain medical problems). If your risk is high and your symptoms started in the last 5 days, we strongly recommend for you to get COVID treatment as soon as possible.There are safe and effective medicines available that can make you feel better faster, and prevent hospitalization and death.       To discuss COVID treatment you can:    Call your clinic OR [phone] ([phone]) and ask to speak to a nurse about a positive COVID test.    Send a  "ContractRoom message to your care team. In ContractRoom, select \"Ask a Medical Question\" then \"Do I need an appointment\" and put \"COVID\" in the subject line. Please include a phone number to call you back in the message.               Get lots of rest. Drink extra fluids (unless a doctor has told you not to)    Take Tylenol (acetaminophen) or ibuprofen for fever or pain. If you have liver or kidney problems, ask your family doctor if it's okay to take Tylenol or ibuprofen    Take over the counter medications for your symptoms, as directed by your doctor. You may also talk to your pharmacist.      If you have other health problems (like cancer, heart failure, an organ transplant or severe kidney disease): Call your specialty clinic if you don't feel better in the next 2 days.    Know when to call 911. Emergency warning signs include:  o Trouble breathing or shortness of breath  o Pain or pressure in the chest that doesn't go away  o Feeling confused like you haven't felt before, or not being able to wake up  o Bluish-colored lips or face    Where can I get more information?    [facility] - About COVID-19: [email or web address]     CDC - What to Do If You're Sick: https://www.cdc.gov/coronavirus/2019-ncov/if-you-are-sick/index.html     CDC -  Isolation https://www.cdc.gov/coronavirus/2019-ncov/your-health/isolation.html  "

## 2023-03-02 ENCOUNTER — LAB (OUTPATIENT)
Dept: LAB | Facility: CLINIC | Age: 38
End: 2023-03-02
Attending: FAMILY MEDICINE
Payer: COMMERCIAL

## 2023-03-02 DIAGNOSIS — R05.9 COUGH, UNSPECIFIED TYPE: ICD-10-CM

## 2023-03-02 LAB
FLUAV AG SPEC QL IA: NEGATIVE
FLUBV AG SPEC QL IA: NEGATIVE
SARS-COV-2 RNA RESP QL NAA+PROBE: NEGATIVE

## 2023-03-02 PROCEDURE — 87804 INFLUENZA ASSAY W/OPTIC: CPT | Performed by: FAMILY MEDICINE

## 2023-03-02 PROCEDURE — U0005 INFEC AGEN DETEC AMPLI PROBE: HCPCS

## 2023-03-02 PROCEDURE — U0003 INFECTIOUS AGENT DETECTION BY NUCLEIC ACID (DNA OR RNA); SEVERE ACUTE RESPIRATORY SYNDROME CORONAVIRUS 2 (SARS-COV-2) (CORONAVIRUS DISEASE [COVID-19]), AMPLIFIED PROBE TECHNIQUE, MAKING USE OF HIGH THROUGHPUT TECHNOLOGIES AS DESCRIBED BY CMS-2020-01-R: HCPCS

## 2023-03-13 ENCOUNTER — NURSE TRIAGE (OUTPATIENT)
Dept: FAMILY MEDICINE | Facility: CLINIC | Age: 38
End: 2023-03-13
Payer: COMMERCIAL

## 2023-03-13 DIAGNOSIS — U07.1 INFECTION DUE TO 2019 NOVEL CORONAVIRUS: Primary | ICD-10-CM

## 2023-03-13 PROCEDURE — 99207 PR NO CHARGE LOS: CPT | Performed by: FAMILY MEDICINE

## 2023-03-13 NOTE — TELEPHONE ENCOUNTER
RN COVID TREATMENT VISIT  03/13/23      The patient has been triaged and does not require a higher level of care.    pAril Toure  37 year old  Current weight? 165    Has the patient been seen by a primary care provider at an [facility] within the past two years? Yes.   Have you been in close proximity to/do you have a known exposure to a person with a confirmed case of influenza? No.     General treatment eligibility:  Date of positive COVID test (PCR or at home)?  3/13/2023    Are you or have you been hospitalized for this COVID-19 infection? No.   Have you received monoclonal antibodies or antiviral treatment for COVID-19 since this positive test? No.   Do you have any of the following conditions that place you at risk of being very sick from COVID-19?   - Chronic liver diseases such as alcoholic liver disease, autoimmune hepatitis, cirrhosis, non-alcoholic fatty liver   - Overweight or Obesity (BMI >85th percentile or BMI 25 or higher)  Yes, patient has at least one high risk condition as noted above.     Current COVID symptoms:   - fever or chills  - cough  - fatigue  - muscle or body aches  - headache  - sore throat  - congestion or runny nose  Yes. Patient has at least one symptom as selected.     How many days since symptoms started? 5 days or less. Established patient, 12 years or older weighing at least 88.2 lbs, who has symptoms that started in the past 5 days, has not been hospitalized nor received treatment already, and is at risk for being very sick from COVID-19.     Treatment eligibility by RN:    Are you currently pregnant or nursing? No    Do you have a clinically significant hypersensitivity to nirmatrelvir or ritonavir, or toxic epidermal necrolysis (TEN) or Jalloh-Dusty Syndrome? No    Do you have a history of hepatitis, any hepatic impairment on the Problem List (such as Child-Giles Class C, cirrhosis, fatty liver disease, alcoholic liver  disease), or was the last liver lab (hepatic panel, ALT, AST, ALK Phos, bilirubin) elevated in the past 6 months? No    Do you have any history of severe renal impairment (eGFR < 30mL/min)? No    Is patient eligible to continue?   Yes, patient meets all eligibility requirements for the RN COVID treatment (as denoted by all no responses above).     Current Outpatient Medications   Medication Sig Dispense Refill     loratadine (CLARITIN) 10 MG tablet Take 10 mg by mouth daily       Multiple Vitamin (MULTIVITAMIN ADULT PO)        SUMAtriptan (IMITREX) 50 MG tablet Take 1 tablet (50 mg) by mouth at onset of headache for migraine May repeat in 2 hours. Max 4 tablets/24 hours. 12 tablet 0       Medications from List 1 of the standing order (on medications that exclude the use of Paxlovid) that patient is taking: NONE. Is patient taking Cerulean's Wort? No  Is patient taking Osvaldo's Wort or any meds from List 1? No.   Medications from List 2 of the standing order (on meds that provider needs to adjust) that patient is taking: NONE. Is patient on any of the meds from List 2? No.   Medications from List 3 of standing order (on meds that a RN needs to adjust) that patient is taking: NONE. Is patient on any meds from List 3? No.     Paxlovid has an approximate 90% reduction in hospitalization. Paxlovid can possibly cause altered sense of taste, diarrhea (loose, watery stools), high blood pressure, muscle aches.     Would patient like a Paxlovid prescription?   Yes.   Lab Results   Component Value Date    GFRESTIMATED >90 11/11/2022       Was last eGFR reduced? No, eGFR 60 or greater/ No Result on record. Patient can receive the normal renal function dose. Paxlovid Rx sent to [facility]       Temporary change to home medications: None    All medication adjustments (holds, etc) were discussed with the patient and patient was asked to repeat back (teachback) their med adjustment.  Did patient understand med adjustment?  "No medication adjustments needed.         Reviewed the following instructions with the patient:    Paxlovid (nimatrelvir and ritonavir)    How it works  Two medicines (nirmatrelvir and ritonavir) are taken together. They stop the virus from growing. Less amount of virus is easier for your body to fight.    How to take    Medicine comes in a daily container with both medicine tablets. Take by mouth twice daily (once in the morning, once at night) for 5 days.    The number of tablets to take varies by patient.    Don't chew or break capsules. Swallow whole.    When to take  Take as soon as possible after positive COVID-19 test result, and within 5 days of your first symptoms.    Possible side effects  Can cause altered sense of taste, diarrhea (loose, watery stools), high blood pressure, muscle aches.    Ashlee Vega RN                   1. COVID-19 DIAGNOSIS: \"Who made your COVID-19 diagnosis?\" \"Was it confirmed by a positive lab test or self-test?\" If not diagnosed by a doctor (or NP/PA), ask \"Are there lots of cases (community spread) where you live?\" Note: See [facility] health department website, if unsure.      Home test  2. COVID-19 EXPOSURE: \"Was there any known exposure to COVID before the symptoms began?\" CDC Definition of close contact: within 6 feet (2 meters) for a total of 15 minutes or more over a 24-hour period.       Work Boss tested positive.  3. ONSET: \"When did the COVID-19 symptoms start?\"       Sunday, 3/12/2023.   4. WORST SYMPTOM: \"What is your worst symptom?\" (e.g., cough, fever, shortness of breath, muscle aches)      Sore throat.  5. COUGH: \"Do you have a cough?\" If Yes, ask: \"How bad is the cough?\"        Yes.  Not severe.   6. FEVER: \"Do you have a fever?\" If Yes, ask: \"What is your temperature, how was it measured, and when did it start?\"      Yes.   7. RESPIRATORY STATUS: \"Describe your breathing?\" (e.g., shortness of breath, wheezing, unable to speak)       No.   8. BETTER-SAME-WORSE: " "\"Are you getting better, staying the same or getting worse compared to yesterday?\"  If getting worse, ask, \"In what way?\"      Worse.   9. HIGH RISK DISEASE: \"Do you have any chronic medical problems?\" (e.g., asthma, heart or lung disease, weak immune system, obesity, etc.)      Yes.   10. VACCINE: \"Have you had the COVID-19 vaccine?\" If Yes, ask: \"Which one, how many shots, when did you get it?\"        Yes.   11. BOOSTER: \"Have you received your COVID-19 booster?\" If Yes, ask: \"Which one and when did you get it?\"        Pfizer. Yes boosters.   12. PREGNANCY: \"Is there any chance you are pregnant?\" \"When was your last menstrual period?\"        No.   13. OTHER SYMPTOMS: \"Do you have any other symptoms?\"  (e.g., chills, fatigue, headache, loss of smell or taste, muscle pain, sore throat)        Chills. Fatigue. Headache. Muscle pain. Sore throat.   14. O2 SATURATION MONITOR:  \"Do you use an oxygen saturation monitor (pulse oximeter) at home?\" If Yes, ask \"What is your reading (oxygen level) today?\" \"What is your usual oxygen saturation reading?\" (e.g., 95%)        No.      Reason for Disposition    [1] COVID-19 diagnosed by positive lab test (e.g., PCR, rapid self-test kit) AND [2] mild symptoms (e.g., cough, fever, others) AND [3] no complications or SOB    Additional Information    Negative: SEVERE difficulty breathing (e.g., struggling for each breath, speaks in single words)    Negative: Difficult to awaken or acting confused (e.g., disoriented, slurred speech)    Negative: Bluish (or gray) lips or face now    Negative: Shock suspected (e.g., cold/pale/clammy skin, too weak to stand, low BP, rapid pulse)    Negative: Sounds like a life-threatening emergency to the triager    Negative: [1] Diagnosed or suspected COVID-19 AND [2] symptoms lasting 3 or more weeks    Negative: [1] COVID-19 exposure AND [2] no symptoms    Negative: COVID-19 vaccine reaction suspected (e.g., fever, headache, muscle aches) occurring 1 to " 3 days after getting vaccine    Negative: COVID-19 vaccine, questions about    Negative: [1] Lives with someone known to have influenza (flu test positive) AND [2] flu-like symptoms (e.g., cough, runny nose, sore throat, SOB; with or without fever)    Negative: [1] Adult with possible COVID-19 symptoms AND [2] triager concerned about severity of symptoms or other causes    Negative: COVID-19 and breastfeeding, questions about    Negative: SEVERE or constant chest pain or pressure  (Exception: Mild central chest pain, present only when coughing.)    Negative: MODERATE difficulty breathing (e.g., speaks in phrases, SOB even at rest, pulse 100-120)    Negative: Headache and stiff neck (can't touch chin to chest)    Negative: Oxygen level (e.g., pulse oximetry) 90 percent or lower    Negative: Chest pain or pressure  (Exception: MILD central chest pain, present only when coughing)    Negative: Patient sounds very sick or weak to the triager    Negative: MILD difficulty breathing (e.g., minimal/no SOB at rest, SOB with walking, pulse <100)    Negative: Fever > 103 F (39.4 C)    Negative: [1] Fever > 101 F (38.3 C) AND [2] over 60 years of age    Negative: [1] Fever > 100.0 F (37.8 C) AND [2] bedridden (e.g., nursing home patient, CVA, chronic illness, recovering from surgery)    Negative: [1] HIGH RISK for severe COVID complications (e.g., weak immune system, age > 64 years, obesity with BMI of 30 or higher, pregnant, chronic lung disease or other chronic medical condition) AND [2] COVID symptoms (e.g., cough, fever)  (Exceptions: Already seen by PCP and no new or worsening symptoms.)    Negative: [1] HIGH RISK patient AND [2] influenza is widespread in the community AND [3] ONE OR MORE respiratory symptoms: cough, sore throat, runny or stuffy nose    Negative: [1] HIGH RISK patient AND [2] influenza exposure within the last 7 days AND [3] ONE OR MORE respiratory symptoms: cough, sore throat, runny or stuffy nose     Negative: Oxygen level (e.g., pulse oximetry) 91 to 94 percent    Negative: [1] COVID-19 infection suspected by caller or triager AND [2] mild symptoms (cough, fever, or others) AND [3] negative COVID-19 rapid test    Negative: Fever present > 3 days (72 hours)    Negative: [1] Fever returns after gone for over 24 hours AND [2] symptoms worse or not improved    Negative: [1] Continuous (nonstop) coughing interferes with work or school AND [2] no improvement using cough treatment per Care Advice    Negative: Cough present > 3 weeks    Protocols used: CORONAVIRUS (COVID-19) DIAGNOSED OR [id number]

## 2023-12-20 ASSESSMENT — ENCOUNTER SYMPTOMS
MYALGIAS: 0
PALPITATIONS: 0
WEAKNESS: 0
SHORTNESS OF BREATH: 0
NERVOUS/ANXIOUS: 0
DIZZINESS: 0
FREQUENCY: 0
HEARTBURN: 0
JOINT SWELLING: 0
COUGH: 0
EYE PAIN: 0
DIARRHEA: 0
NAUSEA: 0
CHILLS: 0
HEMATOCHEZIA: 0
PARESTHESIAS: 0
DYSURIA: 0
HEADACHES: 0
CONSTIPATION: 0
BREAST MASS: 0
FEVER: 0
ARTHRALGIAS: 0
ABDOMINAL PAIN: 0
HEMATURIA: 0
SORE THROAT: 0

## 2023-12-22 ENCOUNTER — OFFICE VISIT (OUTPATIENT)
Dept: FAMILY MEDICINE | Facility: CLINIC | Age: 38
End: 2023-12-22
Payer: COMMERCIAL

## 2023-12-22 VITALS
OXYGEN SATURATION: 98 % | DIASTOLIC BLOOD PRESSURE: 80 MMHG | BODY MASS INDEX: 25.89 KG/M2 | SYSTOLIC BLOOD PRESSURE: 126 MMHG | RESPIRATION RATE: 16 BRPM | HEIGHT: 69 IN | WEIGHT: 174.8 LBS | HEART RATE: 84 BPM | TEMPERATURE: 97.8 F

## 2023-12-22 DIAGNOSIS — Z00.00 ROUTINE GENERAL MEDICAL EXAMINATION AT A HEALTH CARE FACILITY: Primary | ICD-10-CM

## 2023-12-22 DIAGNOSIS — R41.840 ATTENTION DEFICIT: ICD-10-CM

## 2023-12-22 DIAGNOSIS — F51.01 PRIMARY INSOMNIA: ICD-10-CM

## 2023-12-22 LAB
ALBUMIN SERPL BCG-MCNC: 4.5 G/DL (ref 3.5–5.2)
ALP SERPL-CCNC: 71 U/L (ref 40–150)
ALT SERPL W P-5'-P-CCNC: 54 U/L (ref 0–50)
ANION GAP SERPL CALCULATED.3IONS-SCNC: 9 MMOL/L (ref 7–15)
AST SERPL W P-5'-P-CCNC: 33 U/L (ref 0–45)
BILIRUB SERPL-MCNC: 0.3 MG/DL
BUN SERPL-MCNC: 14 MG/DL (ref 6–20)
CALCIUM SERPL-MCNC: 9.5 MG/DL (ref 8.6–10)
CHLORIDE SERPL-SCNC: 102 MMOL/L (ref 98–107)
CHOLEST SERPL-MCNC: 246 MG/DL
CREAT SERPL-MCNC: 0.8 MG/DL (ref 0.51–0.95)
DEPRECATED HCO3 PLAS-SCNC: 28 MMOL/L (ref 22–29)
EGFRCR SERPLBLD CKD-EPI 2021: >90 ML/MIN/1.73M2
FASTING STATUS PATIENT QL REPORTED: YES
GLUCOSE SERPL-MCNC: 98 MG/DL (ref 70–99)
HBV SURFACE AB SERPL IA-ACNC: >1000 M[IU]/ML
HBV SURFACE AB SERPL IA-ACNC: REACTIVE M[IU]/ML
HDLC SERPL-MCNC: 77 MG/DL
LDLC SERPL CALC-MCNC: 157 MG/DL
NONHDLC SERPL-MCNC: 169 MG/DL
POTASSIUM SERPL-SCNC: 4.4 MMOL/L (ref 3.4–5.3)
PROT SERPL-MCNC: 7.7 G/DL (ref 6.4–8.3)
SODIUM SERPL-SCNC: 139 MMOL/L (ref 135–145)
TRIGL SERPL-MCNC: 60 MG/DL

## 2023-12-22 PROCEDURE — 80061 LIPID PANEL: CPT | Performed by: FAMILY MEDICINE

## 2023-12-22 PROCEDURE — 86706 HEP B SURFACE ANTIBODY: CPT | Performed by: FAMILY MEDICINE

## 2023-12-22 PROCEDURE — 36415 COLL VENOUS BLD VENIPUNCTURE: CPT | Performed by: FAMILY MEDICINE

## 2023-12-22 PROCEDURE — 99395 PREV VISIT EST AGE 18-39: CPT | Performed by: FAMILY MEDICINE

## 2023-12-22 PROCEDURE — 80053 COMPREHEN METABOLIC PANEL: CPT | Performed by: FAMILY MEDICINE

## 2023-12-22 PROCEDURE — 99214 OFFICE O/P EST MOD 30 MIN: CPT | Mod: 25 | Performed by: FAMILY MEDICINE

## 2023-12-22 RX ORDER — TRAZODONE HYDROCHLORIDE 50 MG/1
25-100 TABLET, FILM COATED ORAL
Qty: 60 TABLET | Refills: 1 | Status: SHIPPED | OUTPATIENT
Start: 2023-12-22 | End: 2024-02-16

## 2023-12-22 ASSESSMENT — ENCOUNTER SYMPTOMS
FEVER: 0
NAUSEA: 0
CHILLS: 0
CONSTIPATION: 0
WEAKNESS: 0
HEADACHES: 0
COUGH: 0
EYE PAIN: 0
FREQUENCY: 0
PARESTHESIAS: 0
NERVOUS/ANXIOUS: 0
HEARTBURN: 0
BREAST MASS: 0
DIZZINESS: 0
JOINT SWELLING: 0
MYALGIAS: 0
HEMATURIA: 0
PALPITATIONS: 0
ARTHRALGIAS: 0
HEMATOCHEZIA: 0
ABDOMINAL PAIN: 0
DYSURIA: 0
SORE THROAT: 0
SHORTNESS OF BREATH: 0
DIARRHEA: 0

## 2023-12-22 NOTE — PROGRESS NOTES
SUBJECTIVE:   April is a 38 year old, presenting for the following:  Physical        2023     7:40 AM   Additional Questions   Roomed by Yasmany   Accompanied by self       Healthy Habits:     Getting at least 3 servings of Calcium per day:  Yes    Bi-annual eye exam:  Yes    Dental care twice a year:  NO    Sleep apnea or symptoms of sleep apnea:  None    Diet:  Regular (no restrictions)    Frequency of exercise:  None    Taking medications regularly:  Yes    Medication side effects:  None    Additional concerns today:  Yes    Wonders if can test if in perimenopause?  Has cats, they sleep in room.  Wakes up in the middle of the night.  Hard to fall back asleep.  This summer was hot in the middle of the night.  Periods have been changing, more PMS symptoms.  Length between periods staying the same.    Not on any hormones.  Doesn't know what age family members went through menopause.  Doesn't want to be on hormones.    Cholesterol was high at work health screening.    Also - does she have ADD?  Dad and brother have.      Today's PHQ-2 Score:         Social History     Tobacco Use    Smoking status: Never    Smokeless tobacco: Never   Substance Use Topics    Alcohol use: Yes     Comment: occ             2023     8:22 AM   Alcohol Use   Prescreen: >3 drinks/day or >7 drinks/week? No     Reviewed orders with patient.  Reviewed health maintenance and updated orders accordingly - Yes      Breast Cancer Screenin/11/2022     9:47 AM   Breast CA Risk Assessment (FHS-7)   Do you have a family history of breast, colon, or ovarian cancer? No / Unknown       Pertinent mammograms are reviewed under the imaging tab.    History of abnormal Pap smear:       Latest Ref Rng & Units 2022    10:43 AM   PAP / HPV   PAP  Negative for Intraepithelial Lesion or Malignancy (NILM)    HPV 16 DNA Negative Negative    HPV 18 DNA Negative Negative    Other HR HPV Negative Negative      Reviewed and updated as  "needed this visit by clinical staff   Tobacco  Allergies  Meds  Problems  Med Hx  Surg Hx  Fam Hx          Reviewed and updated as needed this visit by Provider   Tobacco  Allergies  Meds  Problems  Med Hx  Surg Hx  Fam Hx             Review of Systems   Constitutional:  Negative for chills and fever.   HENT:  Negative for congestion, ear pain, hearing loss and sore throat.    Eyes:  Negative for pain and visual disturbance.   Respiratory:  Negative for cough and shortness of breath.    Cardiovascular:  Negative for chest pain, palpitations and peripheral edema.   Gastrointestinal:  Negative for abdominal pain, constipation, diarrhea, heartburn, hematochezia and nausea.   Breasts:  Negative for tenderness, breast mass and discharge.   Genitourinary:  Negative for dysuria, frequency, genital sores, hematuria, pelvic pain, urgency, vaginal bleeding and vaginal discharge.   Musculoskeletal:  Negative for arthralgias, joint swelling and myalgias.   Skin:  Negative for rash.   Neurological:  Negative for dizziness, weakness, headaches and paresthesias.   Psychiatric/Behavioral:  Negative for mood changes. The patient is not nervous/anxious.           OBJECTIVE:   /80 (BP Location: Right arm, Patient Position: Sitting, Cuff Size: Adult Regular)   Pulse 84   Temp 97.8  F (36.6  C) (Temporal)   Resp 16   Ht 1.757 m (5' 9.17\")   Wt 79.3 kg (174 lb 12.8 oz)   LMP 12/10/2023   SpO2 98%   BMI 25.68 kg/m    Physical Exam  GENERAL: healthy, alert and no distress  EYES: Eyes grossly normal to inspection, PERRL and conjunctivae and sclerae normal  HENT: ear canals and TM's normal, nose and mouth without ulcers or lesions  NECK: no adenopathy, no asymmetry, masses, or scars and thyroid normal to palpation  RESP: lungs clear to auscultation - no rales, rhonchi or wheezes  CV: regular rate and rhythm, normal S1 S2, no S3 or S4, no murmur, click or rub, no peripheral edema and peripheral pulses " "strong  ABDOMEN: soft, nontender, no hepatosplenomegaly, no masses and bowel sounds normal  MS: no gross musculoskeletal defects noted, no edema  SKIN: no suspicious lesions or rashes  NEURO: Normal strength and tone, mentation intact and speech normal  PSYCH: mentation appears normal, affect normal/bright    Diagnostic Test Results:  Labs reviewed in Epic    ASSESSMENT/PLAN:   April was seen today for physical.    Diagnoses and all orders for this visit:    Routine general medical examination at a health care facility  Comments:  Up to date pap  Lipids today as was high at work screening  Recheck liver tests as were high in past  Check for Hep B immunity  Est care with new PCP next year  Orders:  -     COMPREHENSIVE METABOLIC PANEL; Future  -     Lipid panel reflex to direct LDL Fasting; Future  -     Hepatitis B Surface Antibody; Future    Primary insomnia - sleep maintenance, middle of the night waking.  Could be anxiety/hormone changes versus habit  Comments:  Plan:trial trazodone to help reset sleep cycle  Also consider using ASMR for sleep soothing sooner (don't wait an hour, start listening after 5-10 min)  Orders:  -     traZODone (DESYREL) 50 MG tablet; Take 0.5-2 tablets ( mg) by mouth nightly as needed for sleep    Attention deficit  Comments:  Brother and Dad have ADHD  She was never diagnosed, but wonders if she has?  Referred for eval  Orders:  -     Adult Mental Health  Referral; Future    Other orders  -     REVIEW OF HEALTH MAINTENANCE PROTOCOL ORDERS  -     PRIMARY CARE FOLLOW-UP SCHEDULING; Future          COUNSELING:  Reviewed preventive health counseling, as reflected in patient instructions      BMI:   Estimated body mass index is 25.68 kg/m  as calculated from the following:    Height as of this encounter: 1.757 m (5' 9.17\").    Weight as of this encounter: 79.3 kg (174 lb 12.8 oz).         She reports that she has never smoked. She has never used smokeless " tobacco.          Nichol Montano MD  [facility]

## 2023-12-22 NOTE — PATIENT INSTRUCTIONS
AIDEE Fox PA Dr Maggie Kappelman Dr. Louisa Paul Dr. Avery Tucker Andrew Seliga, PA        Preventive Health Recommendations  Female Ages 26 - 39  Yearly exam:   See your health care provider every year in order to  Review health changes.   Discuss preventive care.    Review your medicines if you your doctor has prescribed any.    Until age 30: Get a Pap test every three years (more often if you have had an abnormal result).    After age 30: Talk to your doctor about whether you should have a Pap test every 3 years or have a Pap test with HPV screening every 5 years.   You do not need a Pap test if your uterus was removed (hysterectomy) and you have not had cancer.  You should be tested each year for STDs (sexually transmitted diseases), if you're at risk.   Talk to your provider about how often to have your cholesterol checked.  If you are at risk for diabetes, you should have a diabetes test (fasting glucose).  Shots: Get a flu shot each year. Get a tetanus shot every 10 years.   Nutrition:   Eat at least 5 servings of fruits and vegetables each day.  Eat whole-grain bread, whole-wheat pasta and brown rice instead of white grains and rice.  Get adequate Calcium and Vitamin D.     Lifestyle  Exercise at least 150 minutes a week (30 minutes a day, 5 days of the week). This will help you control your weight and prevent disease.  Limit alcohol to one drink per day.  No smoking.   Wear sunscreen to prevent skin cancer.  See your dentist every six months for an exam and cleaning.

## 2024-02-16 DIAGNOSIS — F51.01 PRIMARY INSOMNIA: ICD-10-CM

## 2024-02-16 RX ORDER — TRAZODONE HYDROCHLORIDE 50 MG/1
25-100 TABLET, FILM COATED ORAL
Qty: 60 TABLET | Refills: 1 | Status: SHIPPED | OUTPATIENT
Start: 2024-02-16

## 2024-06-05 ENCOUNTER — OFFICE VISIT (OUTPATIENT)
Dept: FAMILY MEDICINE | Facility: CLINIC | Age: 39
End: 2024-06-05
Payer: COMMERCIAL

## 2024-06-05 VITALS
HEART RATE: 97 BPM | RESPIRATION RATE: 17 BRPM | WEIGHT: 171.5 LBS | BODY MASS INDEX: 25.4 KG/M2 | DIASTOLIC BLOOD PRESSURE: 80 MMHG | SYSTOLIC BLOOD PRESSURE: 118 MMHG | OXYGEN SATURATION: 97 % | TEMPERATURE: 98 F | HEIGHT: 69 IN

## 2024-06-05 DIAGNOSIS — N83.202 LEFT OVARIAN CYST: ICD-10-CM

## 2024-06-05 DIAGNOSIS — R10.2 PELVIC PAIN IN FEMALE: Primary | ICD-10-CM

## 2024-06-05 PROCEDURE — 99214 OFFICE O/P EST MOD 30 MIN: CPT | Performed by: PHYSICIAN ASSISTANT

## 2024-06-05 PROCEDURE — 87491 CHLMYD TRACH DNA AMP PROBE: CPT | Performed by: PHYSICIAN ASSISTANT

## 2024-06-05 PROCEDURE — 87591 N.GONORRHOEAE DNA AMP PROB: CPT | Performed by: PHYSICIAN ASSISTANT

## 2024-06-05 ASSESSMENT — PAIN SCALES - GENERAL: PAINLEVEL: MILD PAIN (3)

## 2024-06-05 NOTE — PROGRESS NOTES
"Assessment and Plan:     (R10.2) Pelvic pain in female  (primary encounter diagnosis)  Comment: had episode of severe RLQ/pelvic pain while in [place] last week, had three waves of intense pain, had to be taken out in , was seen at [facility], had CT scan, transvaginal US, US showed signs of possible torsion on the left but her pain was on the right, at the time of acute pain she had pain that radiated to her mid back, differential diagnosis is broad and includes kidney stone, torsion, PID, she does not have an appendix, she is currently having some crampy lower abdominal pain but no sharp pain and abdominal exam and pelvic exam were normal, I suspect this could have been torsion w/spontaneous detorsion given small amount of free fluid in her pelvis, we discussed this today we also discussed other etiologies like kidney stone however I think given negative CT scan and lack of blood in her urine this is less likely   Plan: NEISSERIA GONORRHOEA PCR, CHLAMYDIA TRACHOMATIS        PCR        Women's health referral to follow-up on ovarian cyst, ibuprofen as needed, discussed reasons to be seen promptly including severe pain, fever or chills    ARTURO Rodas Same Day Provider   30 minutes on the day of the encounter doing chart review, history and exam, documentation and further activities as noted above.          Subjective   April is a 38 year old, presenting for the following health issues:  ER F/U    HPI       ED/UC Followup:    Facility:  [facility]  Date of visit: 6/1/24  Reason for visit: Abdominal pain    April is here for follow-up ED visit      Had menstrual-like cramps [place] on 6[place]24  Had 3 \"waves\" of very intense pain, had to lie down [place] then was taken out in a wheelchair  Pain was right lower abdomen radiating to right middle back  Does not have an appendix  No h/o stones  Hasn't passed a stone   She denies dysuria, vaginal " "discharge   She is in monogamous relationship, no new partners   She had a CT scan and tranvaginal US at [facility], didn't do pelvic at that time  She has had some crampy low abdominal pain since   LMP was 5/15/24        Objective    There were no vitals taken for this visit.  There is no height or weight on file to calculate BMI.    /80 (BP Location: Left arm, Patient Position: Sitting, Cuff Size: Adult Regular)   Pulse 97   Temp 98  F (36.7  C) (Temporal)   Resp 17   Ht 1.757 m (5' 9.17\")   Wt 77.8 kg (171 lb 8 oz)   SpO2 97%   BMI 25.20 kg/m      Physical Exam     GENERAL: healthy, alert and no distress  RESP: lungs clear to auscultation - no rales, no rhonchi, no wheezes  CV: regular rates and rhythm, normal S1 S2, no S3 or S4 and no murmur, no click or rub   ABD: soft, Mild RLQ tenderness, no guarding, no masses   : normal female external genitalia, normal urethral meatus, vaginal mucosa pink, moist, well rugated, scant white discharge, and normal cervix, no adnexal tenderness or masses, no cervical motion tenderness   MS: extremities- no gross deformities noted, no edema  SKIN: no suspicious lesions, no rashes      Signed Electronically by: Rosa Rodriguez PA-C    "

## 2024-06-05 NOTE — PATIENT INSTRUCTIONS
Take tylenol as needed for pain up to 1000mg three times daily, do not exceed 3000mg in 24 hour period    Take ibuprofen as needed for pain up to 600mg three times daily with food

## 2024-06-06 LAB
C TRACH DNA SPEC QL NAA+PROBE: NEGATIVE
N GONORRHOEA DNA SPEC QL NAA+PROBE: NEGATIVE

## 2024-06-07 NOTE — RESULT ENCOUNTER NOTE
LENIN Chen,     Here are my comments about the recent results, STI screen negative.     Please let us know if you have any questions or concerns.    Regards,  Rosa Rodriguez PA-C

## 2024-06-26 ENCOUNTER — MYC MEDICAL ADVICE (OUTPATIENT)
Dept: FAMILY MEDICINE | Facility: CLINIC | Age: 39
End: 2024-06-26

## 2024-08-18 ASSESSMENT — ANXIETY QUESTIONNAIRES
GAD7 TOTAL SCORE: 5
2. NOT BEING ABLE TO STOP OR CONTROL WORRYING: SEVERAL DAYS
3. WORRYING TOO MUCH ABOUT DIFFERENT THINGS: SEVERAL DAYS
GAD7 TOTAL SCORE: 5
7. FEELING AFRAID AS IF SOMETHING AWFUL MIGHT HAPPEN: SEVERAL DAYS
7. FEELING AFRAID AS IF SOMETHING AWFUL MIGHT HAPPEN: SEVERAL DAYS
4. TROUBLE RELAXING: SEVERAL DAYS
6. BECOMING EASILY ANNOYED OR IRRITABLE: NOT AT ALL
GAD7 TOTAL SCORE: 5
IF YOU CHECKED OFF ANY PROBLEMS ON THIS QUESTIONNAIRE, HOW DIFFICULT HAVE THESE PROBLEMS MADE IT FOR YOU TO DO YOUR WORK, TAKE CARE OF THINGS AT HOME, OR GET ALONG WITH OTHER PEOPLE: NOT DIFFICULT AT ALL
1. FEELING NERVOUS, ANXIOUS, OR ON EDGE: SEVERAL DAYS
5. BEING SO RESTLESS THAT IT IS HARD TO SIT STILL: NOT AT ALL
8. IF YOU CHECKED OFF ANY PROBLEMS, HOW DIFFICULT HAVE THESE MADE IT FOR YOU TO DO YOUR WORK, TAKE CARE OF THINGS AT HOME, OR GET ALONG WITH OTHER PEOPLE?: NOT DIFFICULT AT ALL

## 2024-08-20 ASSESSMENT — PATIENT HEALTH QUESTIONNAIRE - PHQ9
10. IF YOU CHECKED OFF ANY PROBLEMS, HOW DIFFICULT HAVE THESE PROBLEMS MADE IT FOR YOU TO DO YOUR WORK, TAKE CARE OF THINGS AT HOME, OR GET ALONG WITH OTHER PEOPLE: SOMEWHAT DIFFICULT
SUM OF ALL RESPONSES TO PHQ QUESTIONS 1-9: 4
SUM OF ALL RESPONSES TO PHQ QUESTIONS 1-9: 4

## 2024-08-21 ENCOUNTER — VIRTUAL VISIT (OUTPATIENT)
Dept: PSYCHOLOGY | Facility: CLINIC | Age: 39
End: 2024-08-21
Attending: FAMILY MEDICINE
Payer: COMMERCIAL

## 2024-08-21 DIAGNOSIS — F41.1 GENERALIZED ANXIETY DISORDER: Primary | ICD-10-CM

## 2024-08-21 DIAGNOSIS — Z13.39 ATTENTION DEFICIT HYPERACTIVITY DISORDER (ADHD) EVALUATION: ICD-10-CM

## 2024-08-21 PROCEDURE — 90832 PSYTX W PT 30 MINUTES: CPT | Mod: 95 | Performed by: PSYCHOLOGIST

## 2024-08-21 ASSESSMENT — COLUMBIA-SUICIDE SEVERITY RATING SCALE - C-SSRS
ATTEMPT LIFETIME: NO
TOTAL  NUMBER OF ABORTED OR SELF INTERRUPTED ATTEMPTS LIFETIME: NO
6. HAVE YOU EVER DONE ANYTHING, STARTED TO DO ANYTHING, OR PREPARED TO DO ANYTHING TO END YOUR LIFE?: NO
2. HAVE YOU ACTUALLY HAD ANY THOUGHTS OF KILLING YOURSELF?: NO
TOTAL  NUMBER OF INTERRUPTED ATTEMPTS LIFETIME: NO
1. IN THE PAST MONTH, HAVE YOU WISHED YOU WERE DEAD OR WISHED YOU COULD GO TO SLEEP AND NOT WAKE UP?: NO
1. HAVE YOU WISHED YOU WERE DEAD OR WISHED YOU COULD GO TO SLEEP AND NOT WAKE UP?: YES

## 2024-08-21 NOTE — PROGRESS NOTES
[facility]   Mental Health & Addiction Services     Progress Note - Initial Visit    Client Name:  April Toure Date: 2024         Service Type: Individual     Visit Start Time: 11:00am  Visit End Time: 11:33am    Visit #: 1    Attendees: Client attended alone    Service Modality:  Video Visit:      Provider verified identity through the following two step process.  Patient provided:  Patient  and Patient address    Telemedicine Visit: The patient's condition can be safely assessed and treated via synchronous audio and visual telemedicine encounter.      Reason for Telemedicine Visit: Services only offered telehealth    Originating Site (Patient Location): Patient's home    Distant Site (Provider Location): Provider Remote Setting- Home Office    Consent:  The patient/guardian has verbally consented to: the potential risks and benefits of telemedicine (video visit) versus in person care; bill my insurance or make self-payment for services provided; and responsibility for payment of non-covered services.     Patient would like the video invitation sent by:  Send to e-mail at: [email or web address]    Mode of Communication:  Video Conference via [facility]    Distant Location (Provider):  Off-site    As the provider I attest to compliance with applicable laws and regulations related to telemedicine.       DATA:  Extended Session (53+ minutes): No  Interactive Complexity: No   Crisis: No     Presenting Concerns/Current Stressors:   Patient presented to session to initiate the ADHD evaluation process.           2024    12:32 PM   PHQ   PHQ-9 Total Score 4   Q9: Thoughts of better off dead/self-harm past 2 weeks Not at all            2024     4:45 PM   GURMEET-7 SCORE   Total Score 5 (mild anxiety)   Total Score 5       ASSESSMENT:  Mental Status Assessment:  Appearance:   Appropriate   Eye Contact:   Good   Psychomotor Behavior: Normal   Attitude:   Cooperative    Orientation:   All  Speech   Rate / Production: Normal/ Responsive   Volume:  Normal   Mood:    Normal  Affect:    Appropriate   Thought Content:  Clear   Thought Form:  Logical   Insight:    Good       Safety Issues and Plan for Safety and Risk Management:   [place] Suicide Severity Rating Scale (Lifetime/Recent)      8/21/2024    11:03 AM   [place] Suicide Severity Rating (Lifetime/Recent)   Q1 Wish to be Dead (Lifetime) Y   Wish to be Dead Description (Lifetime) As a teenager. No plan developed.   1. Wish to be Dead (Past 1 Month) N   Q2 Non-Specific Active Suicidal Thoughts (Lifetime) N   Actual Attempt (Lifetime) N   Has subject engaged in non-suicidal self-injurious behavior? (Lifetime) N   Interrupted Attempts (Lifetime) N   Aborted or Self-Interrupted Attempt (Lifetime) N   Preparatory Acts or Behavior (Lifetime) N   Calculated C-SSRS Risk Score (Lifetime/Recent) No Risk Indicated     Patient denies current fears or concerns for personal safety.  Patient denies current or recent suicidal ideation or behaviors.  Patient denies current or recent homicidal ideation or behaviors.  Patient denies current or recent self injurious behavior or ideation.  Patient denies other safety concerns.  Recommended that patient call 911 or go to the local ED should there be a change in any of these risk factors.   Patient reports there are no firearms in the house.    Diagnostic Criteria:  F41.1:  A. Excessive anxiety and worry, occurring more days than not for at least 6 months about a number of events or activities.   B. The individual finds it difficult to control the worry.  C. The anxiety and worry are associated with 3 or more of 6 symptoms.  D. The anxiety, worry, or physical symptoms cause clinically significant distress or impairment in social, occupational, or other important areas of functioning.  E. The disturbance is not attributable to the physiological effects of a substance (e.g., a drug of abuse, a  medication) or another medical condition (e.g., hyperthyroidism).  F. The disturbance is not better explained by another mental disorder (e.g., anxiety or worry about having panic attacks in panic disorder, negative evaluation in social anxiety disorder [social phobia], contamination or other obsessions in obsessive-compulsive disorder, separation from attachment figures in separation anxiety disorder, reminders of traumatic events in posttraumatic stress disorder, gaining weight in anorexia nervosa, physical complaints in somatic symptom disorder, perceived appearance flaws in body dysmorphic disorder, having a serious illness in illness anxiety disorder, or the content of delusional beliefs in schizophrenia or delusional disorder).      DSM5 Diagnoses: (Sustained by DSM5 Criteria Listed Above)  Diagnoses:   1. Generalized anxiety disorder    2. Attention deficit hyperactivity disorder (ADHD) evaluation      Psychosocial & Contextual Factors: social support, employed    PROMIS-10 Scores  Global Mental Health Score: (P) 12  Global Physical Health Score: (P) 16   PROMIS TOTAL - SUBSCORES: (P) 28      Intervention:              Reviewed symptoms and history of presenting concern. Patient endorsed symptoms consistent with anxiety  and ADHD. History of depression in childhood and through early 20s. Also went though a lot of grief in early 20s. Symptoms in remission now. Patient denied symptoms associated with sonny, panic, OCD, trauma, and perceptual difficulties. Unable to complete diagnostic intake, will be completed in next session.    CBT: socratic questioning, positive reinforcement  EFT: empathetic attunement, emotion checking, emotion naming  MI: open ended questions, affirmations, reflections        Attendance Agreement:  Client has not signed the attendance agreement. Discussed expectations at beginning of this first session and patient agreed.       PLAN:  Provider will continue Diagnostic Assessment in next  session. Patient will complete Merle questionnaires  prior to next session (8/28/2024). Patient will remain sober and CNS to be administered next week.     Patient meets the following risk assessment and triage: Patient denied any current/recent/lifetime history of suicidal ideation and/or behaviors.  No safety plan indicated at this time.     Medical necessity criteria is warranted in order to: Measure a psychological disorder and its severity and functional impairment to determine psychiatric diagnosis when a mental illness is suspected, or to achieve a differential diagnosis from a range of medical/psychological disorders that present with similar constellations of symptoms (e.g., determination and measurement of anxiety severity and impact in the presence of ongoing asthma or heart disease), Perform symptom measurement to objectively measure treatment effectiveness and/or determine the need to refer for pharmacological treatment or other medical evaluation (e.g., based on severity and chronicity of symptoms), and Evaluate primary symptoms of impaired attention and concentration that can occur in many neurological and psychiatric conditions.    Medical necessity for psychological assessment is warranted as a result of the following: (1) A specific clinical question is posed that relates to the condition/symptoms being addressed (2) The question cannot be adequately addressed by clinical interview and/or behavioral observation (3) Results of psychological testing are reasonably expected to provide an answer to the query (4) It is reasonably expected that the testing will provide information leading to a clearer diagnosis and/or guide treatment planning with an expectation of improved clinical outcome.    I acknowledge that, based upon current clinical information, the patient and I have reviewed and discussed issues pertaining to the purpose of therapy/testing, potential therapeutic goals, procedures, risks and  benefits, and estimated duration of therapy/testing. Issues pertaining to fees/insurance and confidentiality were also addressed with the patient, who indicated understanding and elected to continue with appointments. I will not be providing any experimental procedures and, if we agree that a change in clinical procedure would be more beneficial, I will obtain specific consent for that procedure or refer you to another provider who has expertise in that area.       Sayda Huang PsyD,   Clinical Psychologist

## 2024-08-27 ENCOUNTER — OFFICE VISIT (OUTPATIENT)
Dept: OBGYN | Facility: CLINIC | Age: 39
End: 2024-08-27
Attending: PHYSICIAN ASSISTANT
Payer: COMMERCIAL

## 2024-08-27 VITALS
WEIGHT: 173 LBS | SYSTOLIC BLOOD PRESSURE: 119 MMHG | BODY MASS INDEX: 25.42 KG/M2 | TEMPERATURE: 98.7 F | HEART RATE: 77 BPM | DIASTOLIC BLOOD PRESSURE: 70 MMHG

## 2024-08-27 DIAGNOSIS — N83.202 LEFT OVARIAN CYST: ICD-10-CM

## 2024-08-27 PROCEDURE — 99204 OFFICE O/P NEW MOD 45 MIN: CPT | Performed by: OBSTETRICS & GYNECOLOGY

## 2024-08-27 NOTE — PROGRESS NOTES
[facility]- OBGYN    CC:ovarian cyst    S:April Toure is a 39 year old  who presents today for ovarian cyst discussion.  Patient reports she was at the [facility] in [place] with family when she had a 30 minute acute onset lower pelvic pain event.  She reports sitting down and feeling nausea, pressure, jillian and pain along her entire low pelvis that felt like period cramping.  Then she had 3 waves of sharp pain radiating through her pelvis to her right back.  She then went to the ED for evalution.  On chart review:  24-   PROCEDURE:  CT ABDOMEN PELVIS W CONTRAST     HISTORY:  Acute right lower quadrant pain.  Status post appendectomy.     TECHNIQUE:  Helical CT of the abdomen and pelvis was performed using non-ionic intravenous contrast.     COMPARISON:  None available.     FINDINGS CT ABDOMEN/PELVIS:     Lower thorax:  The heart is normal in size.  No focal consolidation in the visualized lung bases.     Liver: unremarkable     Biliary tree: The gallbladder is present. There is no biliary ductal dilatation.     Spleen: unremarkable     Pancreas:  Unremarkable.     Adrenal glands:  Unremarkable.     Kidneys:  There are bilateral symmetric nephrograms without hydronephrosis.     Lymph nodes:   Abdomen: There is no abdominal adenopathy.     Pelvis: There is no pelvic adenopathy.     Vasculature:  The aorta is normal caliber.     Peritoneum/mesentery/omentum:  Trace free pelvic fluid is nonspecific and likely physiologic for the patient's age.  No free air.     GI tract:  There is no bowel obstruction.  Status post appendectomy. Cecum is low-lying in the pelvis, variant anatomy.     Pelvic urogenital structures:The bladder is poorly distended and not well evaluated. The uterus is present. There is no adnexal mass.  Left corpus luteal cyst (2/125).     Body wall:  The osseous structures are unremarkable.       Key: (S/I) = series number / image number       IMPRESSION:      1.  No acute  abnormality in the abdomen or pelvis to explain patient's abdominal pain.   2.  Nonspecific trace free pelvic fluid, likely physiologic for the patient's age.     6/3/24-Impression    IMPRESSION:   Unremarkable uterus and right ovary. Left ovarian cystic   interface with complex debris, however possible mural   papillary projection cannot be excluded. While color and   pulsed wave Doppler suggest arterial and venous flow to both   ovaries, ultrasound cannot definitively exclude or confirm the   diagnosis of ovarian torsion. A small amount of free fluid   seen in the pelvis.  ----------------------------------------------------------------------  RECOMMENDATIONS:   Consider alternate imaging modality if additional information   is needed.    Thank you for sharing in the care of Ms. MAHOGANY ROBLES  with us. Please do not hesitate to contact us if you have any  questions or concerns.            Nancy Villarreal MD  Electronically Signed Final Report 2024 11:22 AM  Narrative    ----------------------------------------------------------------------   Gynecological Report                    (Signed Final 2024 11:22 am)  ----------------------------------------------------------------------  PATIENT INFO:   ID #:       [id number]                       :  [birth year] (38 yrs)(F)   Name:       MAHOGANY ROBLES                   Visit Date: 2024 10:26 pm  ----------------------------------------------------------------------  PERFORMED BY:   Performed By:     Maite DUMONT           Referred By:      LEE ANN OLEARY   Attending:        Nancy Villarreal MD      Location:         [facility]  ----------------------------------------------------------------------  TECHNIQUE/SCAN QUALITY:   Technique:      Transabdominal and transvaginal imaging                   were  performed to evaluate the pelvic                   structures.  ----------------------------------------------------------------------  COMPARISON:   No prior gynecologic ultrasound images are available for   comparison.  ----------------------------------------------------------------------  HISTORY:   Age:   38                   LMP:   05/15/24          Day Of Cycle:   18   Menses:                Regular  ----------------------------------------------------------------------  PREVIOUS PELVIC SURGERY:   Appendectomy  ----------------------------------------------------------------------  HX COMMENTS:   hCG negative. Pelvic pain.  ----------------------------------------------------------------------  UTERUS:   Uterus:     Visualized   Position:   Anteverted   Cervix Length:   3.14  cm   Size (cm)    L:  8.02      W:   4.39       H:  3.04   Vol (ml):        34.1  (Cervix excluded)   Description:  Normal appearance  ----------------------------------------------------------------------  ENDOMETRIUM:   Endometrium:            Visualized   Thickness(mm):          10.8   Echotexture:            Three lined echo-architecture  ----------------------------------------------------------------------  CERVIX:   Nabothian cysts visualized.  ----------------------------------------------------------------------  FALLOPIAN TUBE:   No adnexal masses noted.  ----------------------------------------------------------------------  RIGHT OVARY:   Status:   Visualized   Size (cm)    L:  2.67      W:   1.58       H:  1.61     Vol.(ml):  3.56   Morphology:    Follicles   Type:   Largest follicle   Size (cm)    L:  0.9       W:   1          H:  0.4      Vol.(ml):  0.19   Comment:    Color Doppler is unremarkable. A scant amount of fluid is               noted in the right adnexa.  ----------------------------------------------------------------------  LEFT OVARY:   Status:   Visualized   Size (cm)    L:  3.21      W:   3.17        H:  2.2      Vol.(ml):  11.72   Morphology:    Cystic interface   Type:   Complex, see comments   Size (cm)    L:  2.2       W:   1.9        H:  1.8      Vol.(ml):  3.94   Comment:    Color Doppler is unremarkable. Cystic interface with               complex debris vs possible mural papillary projections.               Color Doppler is unremarkable. A scant amount of fluid is               noted in the left adnexa.  Lipase WNL, HCG neg, CBC WNL except WBC of 16.8, BMP WNL, UA neg    24- saw Rosa Rodriguez.  GC/CT negative.  Referred to GYN.    Today she states she has a little bit of on and off twinges in the low pelvis.  She denies any recurrence of intense pain.  She states she has a regular, monthly period.  It typically is heavy with cramping.  Her spouse has a vasectomy for birth control.  They do not plan future pregnancies.      OBGYN Hx:     No LMP recorded.  Menses:regular, monthly, lasting 5-7 days, heavy and crampy   Sexually active with one male partner, her spouse  Contraception: has been on OCPs, nuvaring, and char in the past.  Feels being off hormonal birth control has been good for connecting with her emotions especially with her acting.   STD Hx: none  Pap hx:22 NILM HPV negative    PMH:   Past Medical History:   Diagnosis Date    Fatty liver    She denies any history of VTE, Migraine with aura, or HTN    PSH:  Past Surgical History:   Procedure Laterality Date    APPENDECTOMY  1997       Meds:  Current Outpatient Medications   Medication Sig Dispense Refill    loratadine (CLARITIN) 10 MG tablet Take 10 mg by mouth daily      Multiple Vitamin (MULTIVITAMIN ADULT PO)       traZODone (DESYREL) 50 MG tablet Take 0.5-2 tablets ( mg) by mouth nightly as needed for sleep 60 tablet 1     No current facility-administered medications for this visit.       Allergies:No Known Allergies    SH:Denies any tobacco use.  Consumes 0-1 drinks per week, THC use monthly  FH:I have reviewed  "this patient's family history and updated it with pertinent information if needed.  Family History   Problem Relation Age of Onset    Cancer Mother         Pancreatic    Other Cancer Mother         Pancreatic- Passed in 2007    Cancer Paternal Grandmother         stomach    Other Cancer Paternal Grandmother         Stomach Cancer- Passed in 2008    Osteoporosis Paternal Grandmother     Heart Disease Maternal Grandfather         MI    Hypertension Maternal Grandmother     Cerebrovascular Disease Maternal Grandmother          O: Patient Vitals for the past 24 hrs:   BP Temp Pulse Weight   24 0927 119/70 98.7  F (37.1  C) 77 78.5 kg (173 lb)   ]  Exam:  General- awake, alert, answering questions appropriately, appears comfortable   CV- regular rate  Lung- breathing comfortably on room air    A&P: April Toure is a 39 year old  who presents today for ovarian cyst discussion.      (N83.202) Left ovarian cyst  Comment: Reviewed history with patient, imaging from ED visit, and negative infection testing.  Explained likely pain was due to hemorrhagic ovarian cysts.  Lower on differential is intermittent ovarian torsion.  Discussed formation of ovulatory/function/simple cysts.  Reviewed recommendation for repeat ultrasound given intermittent \"twinges\" of pain.  Reviewed options for ovulatory suppression to avoid this from happening in the future.  Discussed POP, OCPs, nuvaring, depo provera.  Patient will consider these options.  Plan for repeat ultrasound with follow up visit (ok for video visit) to discuss next steps.   Plan: US Transvaginal Pelvic Non-OB          Isidra Panchal MD            "

## 2024-08-28 ENCOUNTER — VIRTUAL VISIT (OUTPATIENT)
Dept: PSYCHOLOGY | Facility: CLINIC | Age: 39
End: 2024-08-28
Payer: COMMERCIAL

## 2024-08-28 DIAGNOSIS — Z13.39 ATTENTION DEFICIT HYPERACTIVITY DISORDER (ADHD) EVALUATION: ICD-10-CM

## 2024-08-28 DIAGNOSIS — F41.1 GENERALIZED ANXIETY DISORDER: Primary | ICD-10-CM

## 2024-08-28 PROCEDURE — 90791 PSYCH DIAGNOSTIC EVALUATION: CPT | Mod: 95 | Performed by: PSYCHOLOGIST

## 2024-08-28 NOTE — PROGRESS NOTES
[facility]  Provider Name:  Sayda Huang     Credentials:  KARO Kothari    PATIENT'S NAME: April Toure  PREFERRED NAME: April  PRONOUNS: she/her  MRN: [id number]  : 1985  ADDRESS: [address] [place] [id number]  ACCT. NUMBER:  [id number]  DATE OF SERVICE: 24  START TIME: 9:00am  END TIME: 9:45am  PREFERRED PHONE: [phone]  SERVICE MODALITY:  Video Visit:      Provider verified identity through the following two step process.  Patient provided:  Patient  and Patient address    Telemedicine Visit: The patient's condition can be safely assessed and treated via synchronous audio and visual telemedicine encounter.      Reason for Telemedicine Visit: Services only offered telehealth    Originating Site (Patient Location): Patient's home    Distant Site (Provider Location): Provider Remote Setting- Home Office    Consent:  The patient/guardian has verbally consented to: the potential risks and benefits of telemedicine (video visit) versus in person care; bill my insurance or make self-payment for services provided; and responsibility for payment of non-covered services.     Patient would like the video invitation sent by:  Send to e-mail at: [email or web address]    Mode of Communication:  Video Conference via Amwell    Distant Location (Provider):  Off-site    As the provider I attest to compliance with applicable laws and regulations related to telemedicine.    UNIVERSAL ADULT Mental Health DIAGNOSTIC ASSESSMENT    Identifying Information:  Patient is a 39 year old,  woman. The pronoun use throughout this assessment reflects the patient's chosen pronoun. Patient was referred for an assessment by Nichol Montano MD. Patient attended the session alone.     Chief Complaint:   Patient reported seeking services at this time for diagnostic assessment and recommendations for treatment. Patient's presenting concerns include: Inattention.  The patient indicated that  "while working with a previous individual psychotherapist, that individual commented about \"ADHD thought spirals.\"  This prompted the patient to consider ADHD as a possible diagnosis.  She stated that her mother believes the patient's brother and father have ADHD. Specifically, the patient reported experiencing the following symptoms: difficulty sustaining attention, not following through with tasks, difficulty organizing, avoiding tasks that require sustained mental effort, and being forgetful.     Patient reported that she has not been assessed for ADHD in the past.  Some symptoms reportedly began in childhood. The patient reported a history of anxiety symptoms that have also been present since childhood.  She reported catastrophizing, worrying about worst-case scenarios, ruminating on social situations, worrying about her health and other generalized problems.  She further reported a history of depression symptoms that began in younger years as well.  She reported having passive thoughts of death in her teenage years.  She stated that she was bullied throughout elementary and middle school years and her baseline emotion was sad.  She denied current symptoms. Client reported that other professional(s) are involved in providing services, as she was referred by her PCP.    Social/Family History:  Patient reported she grew up  outside [place], [place] . Patient was the third born of 4 children. There are no known complications during pregnancy or delivery.  Her parents remained  until her mother passed when the patient was 22 years old.  She stated that her mother worked inside the home while her father worked outside the home.  They were a \"traditional\" family who also went to [employer] on Sundays.  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  She reported not always knowing the \"rules\" of social " "settings.  Continues to have positive relationships with family members.    The patient denied a history of special education programming and receiving tutoring services. Patient denied a history of head injuries.  However, the patient stated that her mother stated that most members of the family had \"mild dyslexia.\"  She stated that her mother did not have anyone tested because she did not want them to have labels.  The patient stated that she continues to be a slow reader, does sometimes read words in the incorrect order, and has difficulties getting words on paper in the correct order.  She further reported difficulties with numbers, as those often feel as though they are flipping order.  As a child, she reported being a perfectionist and being motivated to follow rules.  Denied being disruptive in class.  Homework reportedly completed on time.  The patient stated that on her second day of , she was experiencing emotional difficulties and refused to go into the classroom.  As a result, her mother unenrolled her and had her begin  the next year.  After high school, the patient completed a bachelor's degree in theater at [employer].  Later completed a masters degree about 10 years later.    The patient describes her cultural background as White, American.  Cultural influences and impact on patient's life structure, values, norms, and healthcare:  Cartesian . Patient identified her preferred language to be English. Patient reported she does not need the assistance of an  or other support involved in therapy.     Patient is currently  to her  of 16 years.  She stated that he has not necessarily made comments about ADHD symptoms impacting their relationship. Patient identified their sexual orientation as heterosexual. Patient reported having zero child(eliana). Patient identified siblings, pets, friends, and spouse as part of her support system. Patient " identified the quality of these relationships as good.      Patient is staying in own home/apartment. Patient lives with her . Housing is stable.     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  In a previous office position, the patient indicated that she had Post-it notes all over with many reminders. Patient reports finances are obtained through employment and spouse.     Patient has not served in the .     Patient reported that she has not been involved with the legal system. Patient denies being on probation / parole / under the jurisdiction of the court.    Patient's Strengths and Limitations:  Patient identified the following strengths or resources that will help them succeed in treatment: friends / good social support, family support, insight, intelligence, positive work environment, motivation, strong social skills, and work ethic. Things that may interfere with the patient's success in treatment include: none identified.     Personal and Family Medical History:  Patient reported no family history of mental health issues.  Patient previously received the following mental health diagnosis: an Anxiety Disorder.   Patient has received the following mental health services in the past: counseling.   Patient is not currently receiving any mental health services.  Hospitalizations: None.   Previous/Current commitments: None.     Patient has had a physical exam to rule out medical causes for current symptoms. Date of last physical exam was 12/22/2023. The patient's PCP is Sophie Okeefe PA-C. Patient reported no current medical concerns. Patient denies any issues with pain.. There are not significant appetite/nutritional concerns/weight changes.    Current Outpatient Medications   Medication Sig Dispense Refill    loratadine (CLARITIN) 10 MG tablet Take 10 mg by mouth daily      Multiple Vitamin (MULTIVITAMIN ADULT PO)       traZODone  (DESYREL) 50 MG tablet Take 0.5-2 tablets ( mg) by mouth nightly as needed for sleep 60 tablet 1     No current facility-administered medications for this visit.       N/A - Client does not have prescribed psychiatric medications.    Patient Allergies: No Known Allergies    Medical History:   Past Medical History:   Diagnosis Date    Fatty liver        Family history includes: family history includes Cancer in her mother and paternal grandmother; Cerebrovascular Disease in her maternal grandmother; Heart Disease in her maternal grandfather; Hypertension in her maternal grandmother; Osteoporosis in her paternal grandmother; Other Cancer in her mother and paternal grandmother.    Current Mental Status Exam:   Appearance:  Appropriate    Eye Contact:  Good   Psychomotor:  Normal       Gait / station:  no problem  Attitude / Demeanor: Cooperative   Speech      Rate / Production: Normal/ Responsive      Volume:  Normal  volume      Language:  intact  Mood:   Normal  Affect:   Appropriate    Thought Content: Clear   Thought Process: Logical       Associations: No loosening of associations  Insight:   Good   Judgment:  Intact   Orientation:  All  Attention/concentration: Good    Rating Scales:  PHQ9:        8/20/2024    12:32 PM   PHQ-9 SCORE   PHQ-9 Total Score MyChart 4 (Minimal depression)   PHQ-9 Total Score 4       GAD7:        8/18/2024     4:45 PM   GURMEET-7 SCORE   Total Score 5 (mild anxiety)   Total Score 5       Substance Use:  Patient did not report a family history of substance use concerns; see medical history section for details. Patient has not received chemical dependency treatment in the past. Patient has not ever been to detox. Patient is not currently receiving any chemical dependency treatment. Patient reported  NO  problems as a result of her substance use.    Alcohol: Occasional use in social settings with friends.  Often goes weeks without alcohol use.  Nicotine: None.  Cannabis: Occasional 5 mg  gummies.  Patient asked to maintain sobriety prior to taking CNSVS.  Caffeine: 1-2 cups of coffee per day.  Street Drugs: None.  Prescription Drugs: None.    CAGE: None of the patient's responses to the CAGE screening were positive / Negative CAGE score     Substance Use: No symptoms    Based on the negative CAGE score and clinical interview there are not indications of drug or alcohol abuse.    Significant Losses/Trauma/Abuse/Neglect Issues:   There are indications or report of significant loss, trauma, abuse or neglect issues related to: are no indications and client denies any losses, trauma, abuse, or neglect concerns.  Concerns for possible neglect are not present.    Safety Assessment:   [place] Suicide Severity Rating Scale (Lifetime/Recent)[place] Suicide Severity Rating Scale (Lifetime/Recent)      8/21/2024    11:03 AM   [place] Suicide Severity Rating (Lifetime/Recent)   Q1 Wish to be Dead (Lifetime) Y   Wish to be Dead Description (Lifetime) As a teenager. No plan developed.   1. Wish to be Dead (Past 1 Month) N   Q2 Non-Specific Active Suicidal Thoughts (Lifetime) N   Actual Attempt (Lifetime) N   Has subject engaged in non-suicidal self-injurious behavior? (Lifetime) N   Interrupted Attempts (Lifetime) N   Aborted or Self-Interrupted Attempt (Lifetime) N   Preparatory Acts or Behavior (Lifetime) N   Calculated C-SSRS Risk Score (Lifetime/Recent) No Risk Indicated     Patient denies current homicidal ideation and behaviors.  Patient denies current self-injurious ideation and behaviors.    Patient denied risk behaviors associated with substance use.  Patient denies any high risk behaviors associated with mental health symptoms.  Patient reports the following current concerns for their personal safety: None.  Patient reports there are not firearms in the house.     History of Safety Concerns:  Patient denied a history of homicidal ideation.     Patient denied a history of personal safety concerns.     Patient denied a history of assaultive behaviors.    Patient denied a history of sexual assault behaviors.     Patient denied a history of risk behaviors associated with substance use.  Patient denies any history of high risk behaviors associated with mental health symptoms.  Patient reports the following protective factors: forward or future oriented thinking; dedication to family or friends; safe and stable environment; effectively controls impulses    Risk Plan:  See Recommendations for Safety and Risk Management Plan below.    Review of Patient-Reported Symptoms:  Depression: Change in sleep, Change in energy level, Difficulties concentrating, and Low self-worth  Sary:  No Symptoms  Psychosis: No Symptoms  Anxiety: Excessive worry, Nervousness, Ruminations, and Poor concentration  Panic:  No symptoms  Post Traumatic Stress Disorder:  No Symptoms   Eating Disorder: No Symptoms  ADD / ADHD:  Inattentive, Poor task completion, Poor organizational skills, and Forgetful  Conduct Disorder: No symptoms  Autism Spectrum Disorder: No observed symptoms. An autism spectrum disorder diagnosis requires specialized assessment.  Obsessive Compulsive Disorder: No Symptoms  Patient reports the following compulsive behaviors and treatment history:  No symptoms .      Diagnostic Criteria:   F41.1:  A. Excessive anxiety and worry, occurring more days than not for at least 6 months about a number of events or activities.   B. The individual finds it difficult to control the worry.  C. The anxiety and worry are associated with 3 or more of 6 symptoms.  D. The anxiety, worry, or physical symptoms cause clinically significant distress or impairment in social, occupational, or other important areas of functioning.  E. The disturbance is not attributable to the physiological effects of a substance (e.g., a drug of abuse, a medication) or another medical condition (e.g., hyperthyroidism).  F. The disturbance is not better explained by  another mental disorder (e.g., anxiety or worry about having panic attacks in panic disorder, negative evaluation in social anxiety disorder [social phobia], contamination or other obsessions in obsessive-compulsive disorder, separation from attachment figures in separation anxiety disorder, reminders of traumatic events in posttraumatic stress disorder, gaining weight in anorexia nervosa, physical complaints in somatic symptom disorder, perceived appearance flaws in body dysmorphic disorder, having a serious illness in illness anxiety disorder, or the content of delusional beliefs in schizophrenia or delusional disorder).    Functional Status:  Patient reports the following functional impairments: management of the household and or completion of tasks and work / vocational responsibilities.       PROMIS-10:   Global Mental Health Score: (P) 14  Global Physical Health Score: (P) 15   PROMIS TOTAL - SUBSCORES: (P) 29   Nonprogrammatic care: Patient is requesting basic services to address current mental health concerns.    Clinical Summary:  1. Reason for assessment: assessing reported deficits in executive functioning (rule in/out ADHD).  2. Psychosocial, cultural and contextual factors: Social support, employed part-time.  3. Principal DSM-5 diagnoses (Sustained by DSM5 Criteria Listed Above) and other diagnoses relevant to this service:   1. Generalized anxiety disorder    2. Attention deficit hyperactivity disorder (ADHD) evaluation      4. Prognosis: Expect Improvement.  5. Likely consequences of symptoms if not treated: Continued difficulties with inattention.  6. Client strengths include: creative, educated, employed, has a previous history of therapy, insightful, intelligent, motivated, support of family, friends and providers, and supportive .     Recommendations:   Per medical necessity criteria for psychological testing, patient will complete CNS Vital Signs before feedback session is scheduled. The patient  was made aware that the link expires after 7 days and if the test is not completed within that timeframe, it will be her responsibility to reinitiate contact to resume the testing process.  My contact information was provided.  Patient was in agreement to this plan.    1. Plan for Safety and Risk Management:  Safety and Risk: Recommended that patient call 911 or go to the local ED should there be a change in any of these risk factors..         Report to child / adult protection services was NA.     2. Patient's identified mental health concerns with a cultural influence will be addressed in final recommendations.     3. Initial Treatment will focus on: ADHD testing. See above.      4. Resources/Service Plan:    services are not indicated.   Modifications to assist communication are not indicated.   Additional disability accommodations are not indicated.      5. Collaboration:   Collaboration / coordination of treatment will be initiated with the following  support professionals: primary care physician.      6.  Referrals:   The following referral(s) will be initiated: N/A.    A Release of Information has been obtained for the following: N/A.   Emergency Contact was not obtained.    Clinical Substantiation/medical necessity for the above recommendations:     Medical necessity criteria is warranted in order to: Measure a psychological disorder and its severity and functional impairment to determine psychiatric diagnosis when a mental illness is suspected, or to achieve a differential diagnosis from a range of medical/psychological disorders that present with similar constellations of symptoms (e.g., determination and measurement of anxiety severity and impact in the presence of ongoing asthma or heart disease), Perform symptom measurement to objectively measure treatment effectiveness and/or determine the need to refer for pharmacological treatment or other medical evaluation (e.g., based on severity and  chronicity of symptoms), and Evaluate primary symptoms of impaired attention and concentration that can occur in many neurological and psychiatric conditions.    Medical necessity for psychological assessment is warranted as a result of the following: (1) A specific clinical question is posed that relates to the condition/symptoms being addressed (2) The question cannot be adequately addressed by clinical interview and/or behavioral observation (3) Results of psychological testing are reasonably expected to provide an answer to the query (4) It is reasonably expected that the testing will provide information leading to a clearer diagnosis and/or guide treatment planning with an expectation of improved clinical outcome.    7. SHAGGY: N/A.    8. Records:   These were reviewed at time of assessment.   Information in this assessment was obtained from the medical record and  provided by patient who is a good historian. Patient will have open access to their mental health medical record.    9. Interactive Complexity: No     Parts of this documentation may have been completed using dictation software. Potential errors may result and are unintentional.       Sayda Huang PsyD, LP  August 28, 2024

## 2024-09-12 ENCOUNTER — VIRTUAL VISIT (OUTPATIENT)
Dept: PSYCHOLOGY | Facility: CLINIC | Age: 39
End: 2024-09-12
Payer: COMMERCIAL

## 2024-09-12 DIAGNOSIS — F41.1 GENERALIZED ANXIETY DISORDER: Primary | ICD-10-CM

## 2024-09-12 PROCEDURE — 96131 PSYCL TST EVAL PHYS/QHP EA: CPT | Mod: 95 | Performed by: PSYCHOLOGIST

## 2024-09-12 PROCEDURE — 96130 PSYCL TST EVAL PHYS/QHP 1ST: CPT | Mod: 95 | Performed by: PSYCHOLOGIST

## 2024-09-12 NOTE — PROGRESS NOTES
[facility]   Mental Health & Addiction Services     Progress Note - ADHD Feedback Session     Patient Name: April Toure  Date: 2024       Service Type:  Individual       Session Start Time: 5:00pm  Session End Time:  5:24pm     Session Length: 24 minutes    Session #: 3    Attendees: Patient attended alone    Service Modality: Video Visit:      Provider verified identity through the following two step process.  Patient provided:  Patient  and Patient address    Telemedicine Visit: The patient's condition can be safely assessed and treated via synchronous audio and visual telemedicine encounter.      Reason for Telemedicine Visit: Services only offered telehealth    Originating Site (Patient Location): Patient's place of employment    Distant Site (Provider Location): Provider Remote Setting- Home Office    Consent:  The patient/guardian has verbally consented to: the potential risks and benefits of telemedicine (video visit) versus in person care; bill my insurance or make self-payment for services provided; and responsibility for payment of non-covered services.     Patient would like the video invitation sent by:  Send to e-mail at: [email or web address]    Mode of Communication:  Video Conference via Amwell    Distant Location (Provider):  Off-site    As the provider I attest to compliance with applicable laws and regulations related to telemedicine.          2024    12:32 PM   PHQ   PHQ-9 Total Score 4   Q9: Thoughts of better off dead/self-harm past 2 weeks Not at all           2024     4:45 PM   GURMETE-7 SCORE   Total Score 5 (mild anxiety)   Total Score 5         DATA      Progress Since Last Session (Related to Symptoms / Goals / Homework):   Symptoms: Stable.    Homework: Completed.      Treatment Objective(s) Addressed in This Session:   Provided feedback on ADHD evaluation. Reviewed test results in depth. Plan of  care and recommendations were discussed based on testing data. See full report attached on secondary note in this encounter.     Intervention:   Provided feedback to patient regarding testing results, diagnoses, and treatment recommendations. Test results are not consistent with an ADHD diagnosis. Symptoms are better explained by an anxiety disorder. Personalized suggestions regarding symptoms were offered. Patient had the opportunity to ask questions; she expressed understanding.        ASSESSMENT: Current Emotional / Mental Status (status of significant symptoms):   Risk status (Self / Other harm or suicidal ideation)   Patient denies current fears or concerns for personal safety.   Patient denies current or recent suicidal ideation or behaviors.   Patient denies current or recent homicidal ideation or behaviors.   Patient denies current or recent self injurious behavior or ideation.   Patient denies other safety concerns.   Patient reports there has been no change in risk factors since their last session.     Patient reports there has been no change in protective factors since their last session.     Recommended that patient call 911 or go to the local ED should there be a change in any of these risk factors.     Appearance:   Appropriate    Eye Contact:   Good    Psychomotor Behavior: Normal    Attitude:   Cooperative    Orientation:   All   Speech    Rate / Production: Normal     Volume:  Normal    Mood:    Normal   Affect:    Appropriate    Thought Content:  Clear    Thought Form:  Coherent  Logical    Insight:    Good      Medication Review:   No current psychiatric medications prescribed     Medication Compliance:   NA     Changes in Health Issues:   None reported     Chemical Use Review:   Substance Use: Chemical use reviewed, no active concerns identified      Nicotine Use: No current tobacco use.      Diagnosis:  1. Generalized anxiety disorder          PLAN:   Recommendations are outlined in full  evaluation report (attached to this encounter).   Patient indicated understanding and will contact the clinic if there are further questions.    Parts of this documentation may have been completed using dictation software. Potential errors may result and are unintentional.       Sayda Huang PsyD,   Clinical Psychologist         Psychological Testing Services Summary       Testing Evaluation Services Base: 93440  (first 60 mins) Add-on: 61838  (each addtl 60 mins)   Record Review and Clarify Referral Question   10:50am-11:00am on 8/21/2024 10 minutes   Clinical Decision Making/Battery Modification   7:45am-8:00am on 8/28/2024 15 minutes   Integration/Report Generation   11:30am-12:30pm on 9/11/2024 ([facility])  12:30pm-1:00pm on 9/11/2024 (CNS Vital Signs)  1:00pm-2:00pm on 9/11/2024 (Final Report)   60 minutes  30 minutes  60 minutes   Interactive Feedback Session   5:00pm-5:24pm on 9/12/2024 24 minutes   Post-Service Work   5:24pm-5:39pm on 9/12/2024 15 minutes   Total Time: 214 minutes   Total Units: 1 3       Diagnoses:   1. Generalized anxiety disorder

## 2024-09-12 NOTE — PROGRESS NOTES
"    Psychological Assessment Report    Patient: April Toure  YOB: 1985  MRN: [id number]  Date(s) of assessment: 8/21/2024 and 8/28/2024  Referral Source: Nichol Montano MD - [facility]   Reason for Referral: assessing reported deficits in executive functioning     IDENTIFYING INFORMATION AND BRIEF HISTORY OF PRESENTING PROBLEM: April Toure is a 39-year-old White woman who presented to the initial diagnostic intake appointments on 8/21/2024 and 8/28/2024 (see diagnostic intake dated 8/28/2024 for more detailed background information) due to primary concerns with inattention.  The patient indicated that she has always experienced anxiety and this got significantly worse during the COVID-19 pandemic.  As a result, she sought help from an individual psychotherapist.  During one of their appointments, her therapist reportedly made a comment about \"ADHD thought spirals\" and this prompted the patient to begin wondering about the possibility of ADHD for herself.     In childhood, the patient stated that she was a \"perfectionist\" and was motivated to listen for rules and perform well.  Denied problems with being disruptive in class.  Homework reportedly completed on time.  She stated that her family has often talked about the likelihood that most of them have \"mild dyslexia,\" and the patient indicated having dyscalculia symptoms.  However, she stated that her mother never wanted to have the patient or her siblings tested.  She stated that she was a slow reader and letters flipped in the process of writing.  Occasionally flipping letters while reading as well.  She stated that she is unable to do mental math because numbers flip in her mind.  Completed AP courses in high school.  After high school, completed a bachelor's degree at [employer] in theater.  Later completed a masters degree about 10 years later.  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  Currently, the patient reported experiencing the following symptoms:  Difficulty sustaining attention (the patient reported that ability to pay attention depends, sometimes needs music in background while reading, is able to watch TV but does knit while doing so)  Difficulty organizing (physical space is in \"organized mess,\" projects left out, is using a calendar)  Avoids/dislikes tasks that require sustained mental effort (procrastinates until a deadline)  Is forgetful (needs to write things down and make lists)  Is fidgety (possibly fueled by anxiety and will knit)    Mental Health History: The patient reported a history of anxiety symptoms that have been present since childhood.  She stated that she has \"always been a worrier.\"  Reported problems with catastrophizing, worrying about worst-case scenarios, ruminating on social situations, and experiencing other generalized worry.  She went on to describe that although she functions well with structure, has problems creating her own structure.  Previously worked with an individual psychotherapist to help manage some of the symptoms.  The patient also reported a history of depression symptoms, especially while experiencing bullying in elementary and middle school.  Indicated having passive thoughts of death in teenage years.  At that time, she stated that her baseline mood was sad, though she does not experience this anymore.  The patient denied a history of manic symptoms, social anxiety, phobic responses, symptoms of obsessive-compulsive disorder, trauma, and perceptual difficulties. The patient denied issues with sexual compulsivity, gambling, and disordered eating.    Developmental History: The patient reported that she was born with jaundice and stated that she was \"pretty small\" despite likely " "being a full-term pregnancy.  The patient reported that she believes she met all of her developmental milestones on time. She denied a history of head injuries, tutoring services, and special educational programming.  The patient reported that she grew up with her mother, father, 2 older siblings, and 1 younger sibling in the home.  Her parents remained  until her mother passed when the patient was 22 years old.  She described the household as \"traditional,\" as her father worked outside the home while her mother worked inside the home.  Attended Judaism on Sundays which was reportedly boring.  She indicated that she and her siblings would miss behave the whole time or she would read her own Bible rather than listen.    The patient currently lives with her  of 16 years.  She indicated that he has not made comments about possible ADHD symptoms impacting their relationship.  However, he is reportedly aware of her problems with racing thoughts, perfectionism, and difficulties making decisions as a result.    Chemical Dependence/Substance Abuse History: The patient denied a history of chemical or substance abuse. She reported occasional alcohol use in social settings. Oftentimes goes weeks without. Occasional cannabis use, 5mg gummies. Had about two weeks of sobriety prior to completing testing materials.      SOURCES OF DATA/ASSESSMENT: Review of medical and psychiatric records, consideration of behavioral observations during the testing (if applicable), and the results of the psychological tests are all considered in the preparation of this psychological test report. It is important to note that test results comprise a hypothesis of the patient's mental health concerns and are not an independent or conclusive assessment. Test results are combined with the patient's available medical, psychological, behavioral data for an integrated interpretation and report. Due to virtual/remote administration, certain " aspects of the assessment process were impacted, such as access to direct patient observation, and maintaining an environment conducive to testing. As such, external factors have the potential to affect the validity of data collected.    TESTS ADMINISTERED:  CNS Vital Signs Neurocognitive Battery  Merle Adult ADHD Rating Scale-IV: Self and Other Reports (BAARS-IV)  Merle Functional Impairment Scale: Self and Other Reports (BFIS)  Merle Deficits in Executive Functioning Scale (BDEFS)  Generalized Anxiety Disorder Questionnaire (GURMEET-7)  Patient Health Questionnaire- 9 (PHQ-9)    BEHAVIORAL OBSERVATIONS: The patient was pleasant and cooperative throughout all interview and explanation of testing process. The patient was oriented to person, place, and time. Mood was neutral. Eye contact was adequate and speech was at normal rate and rhythm. Motor activity was appropriate. Due to virtual/remote administration, direct patient observation was not possible during the testing process, and it is unknown if the patient was able to maintain an environment conducive to testing. As such, external factors have the potential to affect the validity of data collected.     TEST RESULTS: Test results comprise a hypothesis of the patient's mental health concerns and are not an independent or conclusive assessment. Test results are combined with the patient's available medical, psychological, behavioral, and observational data for an integrated interpretation and report.    CNS Vital Signs Neurocognitive Battery  The CNS Vital Signs Neurocognitive Battery is a remotely-administered assessment comprised of seven core subtests to individually measure the patient's verbal memory, visual memory, motor speed, psychomotor speed, reaction time, focus, ability to sustain attention and ability to adapt to changing rules and tasks.      Above average domain scores indicate a standard score (SS) greater than 109 or a Percentile Rank ([place])  greater than 74, indicating a high functioning test subject. Average is a SS  or GA 25-74, indicating normal function. Low Average is a SS 80-89 or GA 9-24 indicating a slight deficit or impairment. Below Average is a SS 70-79 or GA 2-8, indicating a moderate level of deficit or impairment. Very Low is a SS less than 70 or a GA less than 2, indicating a deficit and impairment.  Validity Indicator denotes a guideline for representing the possibility of an invalid test or domain score, and can be influenced by patient understanding, effort, or other conditions.    The patient's results are detailed below:    Domain Standard Score Percentile Description Validity   Neurocognitive Index 88 21 Low Average Yes   Composite Memory Measure 117 87 Above Average Yes   Verbal Memory 112 79 Above Average Yes   Visual Memory 116 86 Above Average Yes   Psychomotor Speed 96 40 Average Yes   Reaction Time 44 1 Very Low Yes   Complex Attention 98 45 Average Yes   Cognitive Flexibility 84 14 Low Average Yes   Processing Speed 103 58 Average Yes   Executive Function 82 12 Low Average Yes   Reasoning 110 75 Above Average Yes   Working Memory 109 73 Average Yes   Sustained Attention  108 70 Average Yes   Simple Attention 91 27 Average Yes   Motor Speed 95 37 Average Yes     Neurocognitive Index (NCI): Measures an average score derived from the domain scores or a general assessment of the overall neurocognitive status of the patient. The patient's NCI score is 88, with a percentile of 21, and falls within the low average range.    Composite Memory: Measures how well subject can recognize, remember, and retrieve words and geometric figures, and is comprised of the Visual and Verbal Memory domains. The patient's Composite Memory score is 117, with a percentile of 87, and falls within the above average range.    Verbal Memory: Measures how well subject can recognize, remember, and retrieve words. The patient's Verbal Memory score is  112, with a percentile of 79, and falls within the above average range.    Visual Memory: Measures how well subject can recognize, remember and retrieve geometric figures. The patient's Visual Memory score is 116, with a percentile of 86, and falls within the above average range.    Psychomotor Speed: Measures how well a subject perceives, attends, responds to complex visual-perceptual information and performs simple fine motor coordination, and is comprised of the Motor Speed and Processing Speed indexes. The patient's Psychomotor Speed score is 96, with a percentile of 40, and falls within the average range.    Reaction Time: Measures how quickly the subject can react, in milliseconds, to a simple and increasingly complex direction set. The patient's Reaction Time score is 44, with a percentile of 1, and falls within the very low range.    Complex Attention: Measures the ability to track and respond to a variety of stimuli over lengthy periods of time and/or perform complex mental tasks requiring vigilance quickly and accurately. The patient's Complex Attention score is 98, with a percentile of 45, and falls within the average range.    Cognitive Flexibility: Measures how well subject is able to adapt to rapidly changing and increasingly complex set of directions and/or to manipulate the information. The patient's Cognitive Flexibility score is 84, with a percentile of 14, and falls within the low average range.    Processing Speed: Measures how well a subject recognizes and processes information i.e., perceiving, attending/responding to incoming information, motor speed, fine motor coordination, and visual-perceptual ability. The patient's Processing Speed score is 103, with a percentile of 58, and falls within the average range.    Executive Function: Measures how well a subject recognizes rules, categories, and manages or navigates rapid decision making. The patient's Executive Function score is 82, with a  percentile of 12, and falls within the low average range.    Reasoning: Measures how well a subject can perceive and understand the meaning of visual or abstract information and recognizing relationships between visual-abstract concepts. The patient's Reasoning score is 110, with a percentile of 75, and falls in the above average range.     Working Memory: Measures how well a subject can perceive and attend to symbols using short-term memory processes. Also measures the ability to carry out short-term memory tasks that support decision making, problem solving, planning, and execution. The patient's Working Memory score is 109, with a percentile of 73, and falls in the average range.    Sustained Attention: Measures how well a subject can direct and focus cognitive activity on specific stimuli. Also measurs how well a subject can focus and complete task or activity, sequence action, and focus during complex thought. The patient's Sustained Attention score is 108, with a percentile of 70, and falls in the average range.    Simple Attention: Measures the ability to track and respond to a single defined stimulus over lengthy periods of time while performing vigilance and response inhibition quickly and accurately to a simple task. The patient's Simple Attention score is 91, with a percentile of 27, and falls within the average range.    Motor Speed: Measure: Ability to perform simple movements to produce and satisfy an intention towards a manual action and goal. The patient's Motor Speed score is 95, with a percentile of 37, and falls within the average range.    Merle Adult ADHD Rating Scale-IV: Self and Other Reports (BAARS-IV)  The BAARS-IV assesses for symptoms of ADHD that are experienced in one's daily life. This assessment measure includes self and collateral rating scales designed to provide information regarding current and childhood symptoms of ADHD including inattention, hyperactivity, and impulsivity.  "Self-report scores are reported as percentiles. Scores at the 76th-83rd percentile are considered marginal, scores at the 84th-92nd percentile are considered borderline, scores at the 93rd-95th percentile are considered mild, scores at the 96th-98th percentile are considered moderate, and those at the 99th percentile are considered severe. Collateral or \"other\" rating scales are reported as number of symptoms observed in comparison to those reported by the client. Norms and percentile scores are not available for collateral reports.     Current Symptoms Scale--Self Report:   Client completed the self-report inventory of current symptoms. The results indicate that the client's Total ADHD Score was 29 which places the patient in the 51-75th percentile for overall ADHD symptoms. In addition, the client endorsed 0/9 (1-75th percentile) Inattention symptoms, 2/9 (88th percentile) Hyperactivity-Impulsivity symptoms, and 3/9 (88th percentile) Sluggish Cognitive Tempo symptoms. Client indicated that the reported symptoms have resulted in impaired functioning in home, work, and social relationships. Overall, the results suggest the client is experiencing marginal ADHD symptoms.     Current Symptoms Scale--Other Report:  Client's  completed the collateral report inventory of current symptoms. Based on the collateral contact's observation of symptoms, the client demonstrates 1/9 Inattention symptoms, 0/5 Hyperactivity symptoms, 0/4 Impulsivity symptoms, and 1/9 Sluggish Cognitive Tempo symptoms. The client's Total ADHD Score was 23. The collateral contact indicated the client demonstrates impaired functioning in no areas.  The collateral- and self-report scores are not significantly different.    Childhood Symptoms Scale--Self-Report:  Client completed the self-report inventory of childhood symptoms. The results indicate that the client's Total ADHD Score was 33 which places the patient in the 51-75th percentile for " "overall ADHD symptoms in childhood. In addition, the client endorsed 0/9 (1-75th percentile) Inattention symptoms and 2/9 (83rd percentile) Hyperactivity-Impulsivity symptoms. Client indicated that the reported symptoms resulted in impaired functioning in school and social relationships. Overall, the results suggest the client experienced no significant symptoms of ADHD as a child.     Childhood Symptoms Scale--Other Report:  Client's older sister completed the collateral report inventory of childhood symptoms. Based on the collateral contact's recollection of client's childhood symptoms, the client demonstrated 0/9 Inattention symptoms and 0/9 Hyperactivity-Impulsivity symptoms. The client's Total ADHD Score was 28. The collateral contact indicated the client demonstrates impaired functioning in no areas.  The collateral- and self-report scores are not significantly different.                           Merle Functional Impairment Scale: Self and Other Reports (BFIS)  The BFIS is used to assess an individuals' psychosocial impairment in major life/daily activities that may be due to a mental health disorder. This assessment measure includes self and collateral rating scales. Self-report scores are reported as percentiles. Scores at the 76th-83rd percentile are considered marginal, scores at the 84th-92nd percentile are considered borderline, scores at the 93rd-95th percentile are considered mild, scores at the 96th-98th percentile are considered moderate, and those at the 99th percentile are considered severe. Collateral or \"other\" rating scales are reported as number of symptoms observed in comparison to those reported by the client. Norms and percentile scores are not available for collateral reports.     Results indicate the client identified impairment (scores at or greater than 93rd percentile) in NONE of the following areas: home-family, home-chores, work, social-strangers, social-friends, community " "activities, education, marriage/cohabiting/dating, money management, driving, sexual relations, daily responsibilities, self-care routines, health maintenance and childrearing. The client's Mean Impairment Score was 2.5 (51-75th percentile) indicating the client is reporting 0% impairment in functioning across domains. Client's  completed the collateral rating scale, which indicated similar results. The collateral contact's scores were generally lower than the client's report.     Merle Deficits in Executive Functioning Scale (BDEFS)  The BDEFS is a measure used for evaluating dimensions of adult executive functioning in daily life. This assessment measure includes self and collateral rating scales. Self-report scores are reported as percentiles. Scores at the 76th-83rd percentile are considered marginal, scores at the 84th-92nd percentile are considered borderline, scores at the 93rd-95th percentile are considered mild, scores at the 96th-98th percentile are considered moderate, and those at the 99th percentile are considered severe. Collateral or \"other\" rating scales are reported as number of symptoms observed in comparison to those reported by the client. Norms and percentile scores are not available for collateral reports.     Results indicate the client's Total Executive Functioning Score was 185 (92nd percentile). The ADHD-Executive Functioning Index score was 19 (79th percentile). These scores suggest the client has marginal deficits in executive functioning. Results indicate the client identified significant deficits in the following areas: self-management to time (mild deficits), self-organization/problem-solving (borderline deficits), self-restraint (marginal deficits), self-motivation (marginal deficits) and self-regulation of emotions (borderline deficits). Client's  completed the collateral rating scale, which indicated similar results. The collateral contact's scores were generally " lower than the client's report.    Generalized Anxiety Disorder Questionnaire (UGRMEET-7)  This questionnaire is designed to assess for anxiety in adults. Based on the score, the patient is experiencing mild symptoms of anxiety. Client identified the following symptoms of anxiety: feeling on edge/nervous/anxious, difficulty controlling worry, worrying about many different things, trouble relaxing, becoming easily annoyed or irritable, and feeling something awful might happen.    Patient Health Questionnaire- 9 (PHQ-9)   This questionnaire is designed to assess for depression in adults. Based on the score, the patient is experiencing mild symptoms of depression. Client identified the following symptoms of depression: difficulty with sleep, feeling tired or having little energy, and feeling bad about self.    SUMMARY: April Toure is a 39-year-old White woman who completed psychological testing remotely/virtually. Testing was requested to provide updated diagnostic clarification and necessary treatment recommendations.    Patient first completed a diagnostic interview in which mental health symptoms, ADHD symptoms, and background information was gathered. Patient self-reported about five symptoms of inattention, no symptoms of hyperactivity, and indicated that her abilities to function effectively at work and home are significantly impaired. Both the patient and her  reported no significant symptoms of ADHD on Merle measures. Both the patient and her older sister denied a history of symptoms in childhood.     An objective measure of neurocognitive functioning was also administered.  Results first indicated verbal memory to be scored in the overall above average range.  The patient was able to recognize target shapes immediately and after a delay more effectively than peers.  Visual memory also scored to be in the overall above average range.  She was able to recognize target shapes immediately and after a delay  "comparable to peers.  Motor functioning appears to be intact, as motor speed and psychomotor speed were both scored to be in the average range.  Reasoning scored to be in the above average range, as the patient was able to solve nonverbal puzzle matrices more effectively than peers.  Processing speed scored to be in the average range, as the patient was able to scan and respond to visual stimuli comparable to peers.  On the Stroop test, the patient was able to inhibit the salient, but incorrect, response in the average range.  On a test of shifting attention, the patient was able to adjust her responses according to changing rules sets in the average range.  As such, complex attention was also scored to be in the average range.  Given that her response time tended to be slower than peers, cognitive flexibility and executive functioning were scored to be in the low average range.  On a test of continuous performance, the patient made 1 omission error and was able to resist making impulsive mistakes in the average range.  On a more complex continuous performance test that included one-back and two-back components, the patient was able to sustain her attention across time and hold information in mind for short-term manipulation in the average range.  On CNS Vital Signs, ADHD is associated with significant deficits in attention, processing speed, and executive functioning capabilities.  Although the patient demonstrated some problems with timing on some of the subtests, widespread deficits were not found.  Given that the patient denied both current and childhood history of symptoms, problems cannot be conceptualized as having a neurodevelopmental basis.  Rather, symptoms are much better explained by previously diagnosed generalized anxiety disorder.  Indeed, even the cavalier comment made by her previous individual psychotherapist regarding \"thought spirals\" is more consistent with anxiety than ADHD.  See " recommendations below.    Referral Question Response: DSM-5 criteria for ADHD:   A. Symptom Count - Are there sufficient symptoms for the diagnosis? No, reported symptoms during the clinical interview were subclinical, patient denied all symptoms on Merle measures.   B. Onset - Were several symptoms present before 12 years of age? No, reported symptoms during the clinical interview were subclinical.  C. Pervasiveness - Are several symptoms present in at least two settings? Yes, patient reported that symptoms are problematic at home  and work.   D. Impairment - Do symptoms interfere with or reduce the quality of functioning? Yes, patient is unable to complete daily tasks effectively.   E. Exclusions - Are symptoms better explained by another disorder or factor? Yes, symptoms are better explained by anxiety symptoms. Difficulties are not explained by an organic basis of inattention.     The patient meets the following DSM-5 criteria for generalized anxiety disorder:  A. Excessive anxiety and worry, occurring more days than not for at least 6 months about a number of events or activities.   B. The individual finds it difficult to control the worry.  C. The anxiety and worry are associated with 3 or more of 6 symptoms.  D. The anxiety, worry, or physical symptoms cause clinically significant distress or impairment in social, occupational, or other important areas of functioning.  E. The disturbance is not attributable to the physiological effects of a substance (e.g., a drug of abuse, a medication) or another medical condition (e.g., hyperthyroidism).  F. The disturbance is not better explained by another mental disorder.    DIAGNOSES:  F41.1 Generalized Anxiety Disorder    PLAN OF CARE:  Discuss the following with your primary care provider:  Consider a trial of a psychotropic medication. This may help alleviate some of the patient's anxiety symptoms.     Consider initiating individual psychotherapy to help alleviate  mood symptoms. Research indicates that outcomes are best with both medication and therapy. You can call the [facility] line at [phone].     RECOMMENDATIONS:  Due to the patient's reported attention, concentration, and mood difficulties, the following health/lifestyle changes when combined, can significantly improve symptoms:   Avoid simple carbohydrates at breakfast. Aim for only complex carbohydrates and lean protein for your morning meal.   Engage in aerobic exercise 3 times per week for 30 minutes, ensuring that your heart rate stays within your training zone. Further, reading the book,  Spark,  by Jordon Baker M.D. can help the patient understand the benefits of exercise on the brain.   Research suggest that taking a high-quality multi-vitamin and antioxidant (1/2 cup of blueberries) daily in conjunction with balanced nutrition can be helpful.  Aim for the high end of daily water intake: around 72 ounces per day.  Ensure regular meals and snacks to maintain optimal attention.    The following may be beneficial in managing some of the patient's attention and concentration difficulties:  Due to the patient's difficulties with attention and concentration, consider working in a completely distraction-free area while completing tasks. Workspaces should be completely clear except for the materials needed for the current task. Both visual and auditory distractions should be decreased as much as possible.  Considering decreased ability to focus and maintain attention, it is recommended that the patient take frequent breaks while completing tasks. This will help to maintain attention and effort. The patient may benefit from the use of a Cat[employer]obe Timer. The timer works by using built-in break times. After working on a task consistently for 25 minutes, the timer reminds the user to take a five-minute break before continuing, etc. A Catglobe timer can be downloaded as a free yadi to a phone or  "tablet.  Due to the patient's attentional and concentration symptoms, it is recommended to increase organization with the use of lists and calendars. Significantly increasing structure to the day and adhering to a set schedule can increase your ability to complete responsibilities, track deadline, etc. Breaking these tasks down into their component parts and recording them in a calendar/planner will likely be beneficial. Patient would benefit from setting feasible timelines for completion of activities. By establishing clear priorities for completing tasks, you can more likely complete the most important tasks first. The patient may also choose to elect to a friend or family member to help hold them accountable.    Avoid multitasking. Attempting to work on multiple tasks and projects the same increases the likelihood that an error will occur. Focus on one task at a time.    The patient may benefit from engaging in mindfulness practices. This may include breathing techniques, apps that provide guided meditation, or more interactive activities such as coloring.    Develop a \"coping skills jar/box.\" This entails designating a certain container to hold slips of paper with distraction technique ideas written on each slip of paper. Distraction techniques may include listening to a certain type of music, playing on game on your phone, doing a breathing exercise, spending time with a pet, calling a certain individual, looking at a magazine, working on a puzzle, etc. When feeling distressed, choose a slip of paper from the container and engage in that activity rather than focusing on the problem.      Sayda Huang PsyD,   Clinical Psychologist   "

## 2024-09-30 ENCOUNTER — ANCILLARY PROCEDURE (OUTPATIENT)
Dept: ULTRASOUND IMAGING | Facility: CLINIC | Age: 39
End: 2024-09-30
Attending: OBSTETRICS & GYNECOLOGY
Payer: COMMERCIAL

## 2024-09-30 ENCOUNTER — VIRTUAL VISIT (OUTPATIENT)
Dept: OBGYN | Facility: CLINIC | Age: 39
End: 2024-09-30
Attending: OBSTETRICS & GYNECOLOGY
Payer: COMMERCIAL

## 2024-09-30 DIAGNOSIS — N83.202 LEFT OVARIAN CYST: ICD-10-CM

## 2024-09-30 DIAGNOSIS — Z87.42 HISTORY OF OVARIAN CYST: Primary | ICD-10-CM

## 2024-09-30 PROCEDURE — 99213 OFFICE O/P EST LOW 20 MIN: CPT | Mod: 95 | Performed by: OBSTETRICS & GYNECOLOGY

## 2024-09-30 PROCEDURE — 76830 TRANSVAGINAL US NON-OB: CPT | Performed by: OBSTETRICS & GYNECOLOGY

## 2024-09-30 RX ORDER — ETONOGESTREL AND ETHINYL ESTRADIOL VAGINAL RING .015; .12 MG/D; MG/D
1 RING VAGINAL
Qty: 4 EACH | Refills: 3 | Status: SHIPPED | OUTPATIENT
Start: 2024-09-30 | End: 2025-09-30

## 2024-09-30 NOTE — PROGRESS NOTES
Video-Visit Details    Type of service:  Video Visit   Originating Location (pt. Location): car    Distant Location (provider location):  On-site  Platform used for Video Visit: Angela  Signed Electronically by: Isidra Panchal MD       Video start time: 1331  Video end time: 1342    S: Patient reports she had a few of the sharp pains that last for a few second since we last saw each other on 24.  She states it was this week during her period on the left side.  IT made her stop walking.  Her LMP is 24.      O:   General- awake, alert, answering questions appropriately, appears comfortable  Lung- breathing comfortably on room air    24  Narrative & Impression   Gynecological Ultrasound Report  Pelvic U/S - Transvaginal   [facility]  Referring Provider: Isidra Panchal MD   Sonographer:  Padma Cross RDMS  Indication: Follow up left ovarian cyst seen on outside imaging  LMP: 2024  History: left complex cyst     Gynecological Ultrasonography:   Uterus: anteverted. Contour is smooth/regular.  Size: 7.51 x 4.15 x 3.46 cm  Endometrium: Thickness Total 4.52 mm  Findings: No sonographic evidence of a focal lesion is observed today     Right Ovary: 2.94 x 1.96 x 1.93 cm. Wnl   Left Ovary: 2.60 x 1.85 x 1.37 cm. Wnl    Cul de Sac Free Fluid: No free fluid     Technique: Transvaginal Imaging performed     Impression:      The uterus and ovaries were visualized and no abnormalities were seen.     Rosy Arevalo MD     A&P: April Toure is a 39 year old  who presents today for ultrasound review.    (Z87.42) History of ovarian cyst  (primary encounter diagnosis)  Comment: Reviewed ultrasound findings.  Discussed resolution of left ovarian cyst.  Explained options for ovulatory suppression and menstrual regulation.  Following discussion patient would like to proceed with nuvaring use.  Prescribed.   Plan: etonogestrel-ethinyl estradiol (NUVARING)          0.12-0.015 MG/24HR vaginal ring    Isidra Panchal MD

## 2024-11-12 ENCOUNTER — MYC MEDICAL ADVICE (OUTPATIENT)
Dept: OBGYN | Facility: CLINIC | Age: 39
End: 2024-11-12
Payer: COMMERCIAL

## 2024-12-26 SDOH — HEALTH STABILITY: PHYSICAL HEALTH: ON AVERAGE, HOW MANY MINUTES DO YOU ENGAGE IN EXERCISE AT THIS LEVEL?: 30 MIN

## 2024-12-26 SDOH — HEALTH STABILITY: PHYSICAL HEALTH: ON AVERAGE, HOW MANY DAYS PER WEEK DO YOU ENGAGE IN MODERATE TO STRENUOUS EXERCISE (LIKE A BRISK WALK)?: 4 DAYS

## 2024-12-26 ASSESSMENT — SOCIAL DETERMINANTS OF HEALTH (SDOH): HOW OFTEN DO YOU GET TOGETHER WITH FRIENDS OR RELATIVES?: ONCE A WEEK

## 2024-12-31 ENCOUNTER — OFFICE VISIT (OUTPATIENT)
Dept: FAMILY MEDICINE | Facility: CLINIC | Age: 39
End: 2024-12-31
Attending: FAMILY MEDICINE
Payer: COMMERCIAL

## 2024-12-31 VITALS
BODY MASS INDEX: 25.33 KG/M2 | DIASTOLIC BLOOD PRESSURE: 76 MMHG | SYSTOLIC BLOOD PRESSURE: 112 MMHG | HEART RATE: 79 BPM | HEIGHT: 69 IN | WEIGHT: 171 LBS | RESPIRATION RATE: 12 BRPM | TEMPERATURE: 97.4 F | OXYGEN SATURATION: 100 %

## 2024-12-31 DIAGNOSIS — G43.009 MIGRAINE WITHOUT AURA AND WITHOUT STATUS MIGRAINOSUS, NOT INTRACTABLE: ICD-10-CM

## 2024-12-31 DIAGNOSIS — G44.229 CHRONIC TENSION-TYPE HEADACHE, NOT INTRACTABLE: ICD-10-CM

## 2024-12-31 DIAGNOSIS — Z00.00 ROUTINE GENERAL MEDICAL EXAMINATION AT A HEALTH CARE FACILITY: Primary | ICD-10-CM

## 2024-12-31 DIAGNOSIS — N62 LARGE BREASTS: ICD-10-CM

## 2024-12-31 LAB
ALBUMIN SERPL BCG-MCNC: 4.2 G/DL (ref 3.5–5.2)
ALP SERPL-CCNC: 59 U/L (ref 40–150)
ALT SERPL W P-5'-P-CCNC: 29 U/L (ref 0–50)
ANION GAP SERPL CALCULATED.3IONS-SCNC: 10 MMOL/L (ref 7–15)
AST SERPL W P-5'-P-CCNC: 23 U/L (ref 0–45)
BILIRUB SERPL-MCNC: 0.3 MG/DL
BUN SERPL-MCNC: 14.1 MG/DL (ref 6–20)
CALCIUM SERPL-MCNC: 9.2 MG/DL (ref 8.8–10.4)
CHLORIDE SERPL-SCNC: 103 MMOL/L (ref 98–107)
CHOLEST SERPL-MCNC: 237 MG/DL
CREAT SERPL-MCNC: 0.88 MG/DL (ref 0.51–0.95)
EGFRCR SERPLBLD CKD-EPI 2021: 85 ML/MIN/1.73M2
ERYTHROCYTE [DISTWIDTH] IN BLOOD BY AUTOMATED COUNT: 12 % (ref 10–15)
FASTING STATUS PATIENT QL REPORTED: YES
FASTING STATUS PATIENT QL REPORTED: YES
GLUCOSE SERPL-MCNC: 103 MG/DL (ref 70–99)
HCO3 SERPL-SCNC: 26 MMOL/L (ref 22–29)
HCT VFR BLD AUTO: 43.6 % (ref 35–47)
HDLC SERPL-MCNC: 79 MG/DL
HGB BLD-MCNC: 14.3 G/DL (ref 11.7–15.7)
LDLC SERPL CALC-MCNC: 132 MG/DL
MCH RBC QN AUTO: 29.6 PG (ref 26.5–33)
MCHC RBC AUTO-ENTMCNC: 32.8 G/DL (ref 31.5–36.5)
MCV RBC AUTO: 90 FL (ref 78–100)
NONHDLC SERPL-MCNC: 158 MG/DL
PLATELET # BLD AUTO: 328 10E3/UL (ref 150–450)
POTASSIUM SERPL-SCNC: 4.5 MMOL/L (ref 3.4–5.3)
PROT SERPL-MCNC: 7.8 G/DL (ref 6.4–8.3)
RBC # BLD AUTO: 4.83 10E6/UL (ref 3.8–5.2)
SODIUM SERPL-SCNC: 139 MMOL/L (ref 135–145)
TRIGL SERPL-MCNC: 128 MG/DL
WBC # BLD AUTO: 6.7 10E3/UL (ref 4–11)

## 2024-12-31 PROCEDURE — 85027 COMPLETE CBC AUTOMATED: CPT | Performed by: PHYSICIAN ASSISTANT

## 2024-12-31 PROCEDURE — 99395 PREV VISIT EST AGE 18-39: CPT | Performed by: PHYSICIAN ASSISTANT

## 2024-12-31 PROCEDURE — 36415 COLL VENOUS BLD VENIPUNCTURE: CPT | Performed by: PHYSICIAN ASSISTANT

## 2024-12-31 PROCEDURE — 80061 LIPID PANEL: CPT | Performed by: PHYSICIAN ASSISTANT

## 2024-12-31 PROCEDURE — 99213 OFFICE O/P EST LOW 20 MIN: CPT | Mod: 25 | Performed by: PHYSICIAN ASSISTANT

## 2024-12-31 PROCEDURE — 80053 COMPREHEN METABOLIC PANEL: CPT | Performed by: PHYSICIAN ASSISTANT

## 2024-12-31 ASSESSMENT — PAIN SCALES - GENERAL: PAINLEVEL_OUTOF10: NO PAIN (0)

## 2024-12-31 NOTE — PROGRESS NOTES
"Preventive Care Visit  [facility]  Sophie Okeefe PA-C, Physician Assistant - Medical  Dec 31, 2024      Assessment & Plan     Routine general medical examination at a health care facility  - COMPREHENSIVE METABOLIC PANEL; Future  - CBC with platelets; Future  - Lipid panel reflex to direct LDL Fasting; Future  - COMPREHENSIVE METABOLIC PANEL  - CBC with platelets  - Lipid panel reflex to direct LDL Fasting    Large breasts  Chronic tension-type headache, not intractable - referral to plastic surgery provided  - Adult Plastic Surgery  Referral; Future    Migraine without aura and without status migrainosus, not intractable - will continue to monitor migraines/headaches through next cycle. If persistently worse, would recommend trial d/c nuvaring. If improved with no nuvaring, return to discuss other options to suppress ovulation.    BMI  Estimated body mass index is 25.07 kg/m  as calculated from the following:    Height as of this encounter: 1.759 m (5' 9.25\").    Weight as of this encounter: 77.6 kg (171 lb).     Counseling  Appropriate preventive services were addressed with this patient via screening, questionnaire, or discussion as appropriate for fall prevention, nutrition, physical activity, Tobacco-use cessation, social engagement, weight loss and cognition.  Checklist reviewing preventive services available has been given to the patient.  Reviewed patient's diet, addressing concerns and/or questions.   The patient was instructed to see the dentist every 6 months.   She is at risk for psychosocial distress and has been provided with information to reduce risk.         Todd Chen is a 39 year old, presenting for the following:  Physical (Pt stated that she had been getting more headaches, pt just stated birth control and she thinks it might be related. )        12/31/2024     8:37 AM   Additional Questions   Roomed by Joey Kelsey   Accompanied by Self      April " here today for RHM, establish care    ?worsened headaches carrillo this past month  On nuva ring for ovulation suppression (had very painful cyst rupture this year)  Also had 3 weeks of bleeding 2nd month of use  Spouse has vasectomy, so not concerned about pregnancy    Bra size 36G  Limits her physical activity and types of physical activity she can do  ?migraines come from tension headaches related to shoulder pain from posture related to large breasts  Wonders about breast reduction      HPI  Health Care Directive  Patient does not have a Health Care Directive: Patient states has Advance Directive and will bring in a copy to clinic.      12/26/2024   General Health   How would you rate your overall physical health? Good   Feel stress (tense, anxious, or unable to sleep) To some extent   (!) STRESS CONCERN      12/26/2024   Nutrition   Three or more servings of calcium each day? Yes   Diet: Regular (no restrictions)   How many servings of fruit and vegetables per day? (!) I DON'T KNOW   How many sweetened beverages each day? 0-1         12/26/2024   Exercise   Days per week of moderate/strenous exercise 4 days   Average minutes spent exercising at this level 30 min         12/26/2024   Social Factors   Frequency of gathering with friends or relatives Once a week   Worry food won't last until get money to buy more No    No   Food not last or not have enough money for food? No    No   Do you have housing? (Housing is defined as stable permanent housing and does not include staying ouside in a car, in a tent, in an abandoned building, in an overnight shelter, or couch-surfing.) Yes    Yes   Are you worried about losing your housing? No    No   Lack of transportation? No    No   Unable to get utilities (heat,electricity)? No    No       Multiple values from one day are sorted in reverse-chronological order         12/26/2024   Dental   Dentist two times every year? (!) NO         12/26/2024   TB Screening   Were you born  "outside of the [place]? No     Today's PHQ-2 Score:       12/31/2024     8:25 AM   PHQ-2 ( 1999 [employer])   Q1: Little interest or pleasure in doing things 0   Q2: Feeling down, depressed or hopeless 0   PHQ-2 Score 0    Q1: Little interest or pleasure in doing things Not at all   Q2: Feeling down, depressed or hopeless Not at all   PHQ-2 Score 0       Patient-reported         12/26/2024   Substance Use   Alcohol more than 3/day or more than 7/wk No   Do you use any other substances recreationally? No     Social History     Tobacco Use    Smoking status: Never    Smokeless tobacco: Never   Vaping Use    Vaping status: Never Used   Substance Use Topics    Alcohol use: Yes     Comment: occ    Drug use: No             12/26/2024   Breast Cancer Screening   Family history of breast, colon, or ovarian cancer? No / Unknown      Mammogram Screening - Patient under 40 years of age: Routine Mammogram Screening not recommended.         12/26/2024   STI Screening   New sexual partner(s) since last STI/HIV test? No     History of abnormal Pap smear: No - age 30- 64 PAP with HPV every 5 years recommended        Latest Ref Rng & Units 11/11/2022    10:43 AM   PAP / HPV   PAP  Negative for Intraepithelial Lesion or Malignancy (NILM)    HPV 16 DNA Negative Negative    HPV 18 DNA Negative Negative    Other HR HPV Negative Negative            12/26/2024   Contraception/Family Planning   Questions about contraception or family planning No        Reviewed and updated as needed this visit by Provider                       Objective    Exam  /76   Pulse 79   Temp 97.4  F (36.3  C) (Temporal)   Resp 12   Ht 1.759 m (5' 9.25\")   Wt 77.6 kg (171 lb)   LMP  (LMP Unknown)   SpO2 100%   Breastfeeding No   BMI 25.07 kg/m     Estimated body mass index is 25.07 kg/m  as calculated from the following:    Height as of this encounter: 1.759 m (5' 9.25\").    Weight as of this encounter: 77.6 kg (171 lb).    Physical Exam  GENERAL: alert and " no distress  EYES: Eyes grossly normal to inspection, PERRL and conjunctivae and sclerae normal  HENT: ear canals and TM's normal, nose and mouth without ulcers or lesions  NECK: no adenopathy, no asymmetry, masses, or scars  RESP: lungs clear to auscultation - no rales, rhonchi or wheezes  BREAST: normal without masses, tenderness or nipple discharge and no palpable axillary masses or adenopathy  CV: regular rate and rhythm, normal S1 S2, no S3 or S4, no murmur, click or rub, no peripheral edema  ABDOMEN: soft, nontender, no hepatosplenomegaly, no masses and bowel sounds normal  MS: no gross musculoskeletal defects noted, no edema  SKIN: no suspicious lesions or rashes  NEURO: Normal strength and tone, mentation intact and speech normal  PSYCH: mentation appears normal, affect normal/bright        Signed Electronically by: Sophie Okeefe PA-C

## 2024-12-31 NOTE — PATIENT INSTRUCTIONS
Patient Education   Preventive Care Advice   This is general advice given by our system to help you stay healthy. However, your care team may have specific advice just for you. Please talk to your care team about your preventive care needs.  Nutrition  Eat 5 or more servings of fruits and vegetables each day.  Try wheat bread, brown rice and whole grain pasta (instead of white bread, rice, and pasta).  Get enough calcium and vitamin D. Check the label on foods and aim for 100% of the RDA (recommended daily allowance).  Lifestyle  Exercise at least 150 minutes each week  (30 minutes a day, 5 days a week).  Do muscle strengthening activities 2 days a week. These help control your weight and prevent disease.  No smoking.  Wear sunscreen to prevent skin cancer.  Have a dental exam and cleaning every 6 months.  Yearly exams  See your health care team every year to talk about:  Any changes in your health.  Any medicines your care team has prescribed.  Preventive care, family planning, and ways to prevent chronic diseases.  Shots (vaccines)   HPV shots (up to age 26), if you've never had them before.  Hepatitis B shots (up to age 59), if you've never had them before.  COVID-19 shot: Get this shot when it's due.  Flu shot: Get a flu shot every year.  Tetanus shot: Get a tetanus shot every 10 years.  Pneumococcal, hepatitis A, and RSV shots: Ask your care team if you need these based on your risk.  Shingles shot (for age 50 and up)  General health tests  Diabetes screening:  Starting at age 35, Get screened for diabetes at least every 3 years.  If you are younger than age 35, ask your care team if you should be screened for diabetes.  Cholesterol test: At age 39, start having a cholesterol test every 5 years, or more often if advised.  Bone density scan (DEXA): At age 50, ask your care team if you should have this scan for osteoporosis (brittle bones).  Hepatitis C: Get tested at least once in your life.  STIs (sexually  transmitted infections)  Before age 24: Ask your care team if you should be screened for STIs.  After age 24: Get screened for STIs if you're at risk. You are at risk for STIs (including HIV) if:  You are sexually active with more than one person.  You don't use condoms every time.  You or a partner was diagnosed with a sexually transmitted infection.  If you are at risk for HIV, ask about PrEP medicine to prevent HIV.  Get tested for HIV at least once in your life, whether you are at risk for HIV or not.  Cancer screening tests  Cervical cancer screening: If you have a cervix, begin getting regular cervical cancer screening tests starting at age 21.  Breast cancer scan (mammogram): If you've ever had breasts, begin having regular mammograms starting at age 40. This is a scan to check for breast cancer.  Colon cancer screening: It is important to start screening for colon cancer at age 45.  Have a colonoscopy test every 10 years (or more often if you're at risk) Or, ask your provider about stool tests like a FIT test every year or Cologuard test every 3 years.  To learn more about your testing options, visit:   .  For help making a decision, visit:   https://bit.ly/rx38870.  Prostate cancer screening test: If you have a prostate, ask your care team if a prostate cancer screening test (PSA) at age 55 is right for you.  Lung cancer screening: If you are a current or former smoker ages 50 to 80, ask your care team if ongoing lung cancer screenings are right for you.  For informational purposes only. Not to replace the advice of your health care provider. Copyright   2023 [facility]. All rights reserved. Clinically reviewed by the [facility]. Nvidia [id number] - REV 01/24.  Learning About Stress  What is stress?     Stress is your body's response to a hard situation. Your body can have a physical, emotional, or mental response. Stress is a fact of life for most people, and it  affects everyone differently. What causes stress for you may not be stressful for someone else.  A lot of things can cause stress. You may feel stress when you go on a job interview, take a test, or run a race. This kind of short-term stress is normal and even useful. It can help you if you need to work hard or react quickly. For example, stress can help you finish an important job on time.  Long-term stress is caused by ongoing stressful situations or events. Examples of long-term stress include long-term health problems, ongoing problems at work, or conflicts in your family. Long-term stress can harm your health.  How does stress affect your health?  When you are stressed, your body responds as though you are in danger. It makes hormones that speed up your heart, make you breathe faster, and give you a burst of energy. This is called the fight-or-flight stress response. If the stress is over quickly, your body goes back to normal and no harm is done.  But if stress happens too often or lasts too long, it can have bad effects. Long-term stress can make you more likely to get sick, and it can make symptoms of some diseases worse. If you tense up when you are stressed, you may develop neck, shoulder, or low back pain. Stress is linked to high blood pressure and heart disease.  Stress also harms your emotional health. It can make you bryant, tense, or depressed. Your relationships may suffer, and you may not do well at work or school.  What can you do to manage stress?  You can try these things to help manage stress:   Do something active. Exercise or activity can help reduce stress. Walking is a great way to get started. Even everyday activities such as housecleaning or yard work can help.  Try yoga or chase chi. These techniques combine exercise and meditation. You may need some training at first to learn them.  Do something you enjoy. For example, listen to music or go to a movie. Practice your hobby or do volunteer  "work.  Meditate. This can help you relax, because you are not worrying about what happened before or what may happen in the future.  Do guided imagery. Imagine yourself in any setting that helps you feel calm. You can use online videos, books, or a teacher to guide you.  Do breathing exercises. For example:  From a standing position, bend forward from the waist with your knees slightly bent. Let your arms dangle close to the floor.  Breathe in slowly and deeply as you return to a standing position. Roll up slowly and lift your head last.  Hold your breath for just a few seconds in the standing position.  Breathe out slowly and bend forward from the waist.  Let your feelings out. Talk, laugh, cry, and express anger when you need to. Talking with supportive friends or family, a counselor, or a jason leader about your feelings is a healthy way to relieve stress. Avoid discussing your feelings with people who make you feel worse.  Write. It may help to write about things that are bothering you. This helps you find out how much stress you feel and what is causing it. When you know this, you can find better ways to cope.  What can you do to prevent stress?  You might try some of these things to help prevent stress:  Manage your time. This helps you find time to do the things you want and need to do.  Get enough sleep. Your body recovers from the stresses of the day while you are sleeping.  Get support. Your family, friends, and community can make a difference in how you experience stress.  Limit your news feed. Avoid or limit time on social media or news that may make you feel stressed.  Do something active. Exercise or activity can help reduce stress. Walking is a great way to get started.  Where can you learn more?  Go to https://www.[employer].net/patiented  Enter N032 in the search box to learn more about \"Learning About Stress.\"  Current as of: October 24, 2023  Content Version: 14.3    2024 [employer]. "   Care instructions adapted under license by your healthcare professional. If you have questions about a medical condition or this instruction, always ask your healthcare professional. [employer] disclaims any warranty or liability for your use of this information.

## 2025-01-17 NOTE — PATIENT INSTRUCTIONS
Think of Nature as a multivitamin that you should take every day.  Make an effort to spend time outside every day.    If you spend [place] in [place] vitamin D 400-1000 daily is a good idea October-April.    Social connections are also like a multivitamin; they give us micro-boosts that keep us healthy and happy. Talk to the check out person in the store, connect with your neighbors.  Make an effort to maintain and sustain social connections in your life.    Food is Medicine. The MIND or Mediterranean diet are the best for heart and brain health as well as have a lower lifetime cancer risk.    Weight lifting exercise 2-3x per week is excellent for bone health and maintaining muscle mass, particularly if you are over 40.  It's also helpful in reducing anxiety and boosting mood.    Move your body every day (exercise!).  Exercise is the most effective way to boost mood, reduce anxiety and depression, reduce risks of many chronic diseases and age well.  Find something you enjoy and do it regularly (walk, run, take a dance class, join a gym, ski, roller-blade, get into yoga, learn chase chi...)    Prioritize your sleep! Adults age 26-64 need 7-9 hours of sleep a night.  Older adults 65+ need 7-8 hours of sleep a night.  Studies show that people who sleep seven hours a night are healthier and live longer. Sleeping less than seven hours is associated with a range of health problems including obesity, dementia, heart disease, depression and impaired immune function.    Patient Education    Preventive Health Recommendations  Female Ages 26 - 39  Yearly exam:   See your health care provider every year in order to  Review health changes.   Discuss preventive care.    Review your medicines if you your doctor has prescribed any.    Until age 30: Get a Pap test every three years (more often if you have had an abnormal result).    After age 30: Talk to your doctor about whether you should have a Pap test every 3 years or have a Pap test  with HPV screening every 5 years.   You do not need a Pap test if your uterus was removed (hysterectomy) and you have not had cancer.  You should be tested each year for STDs (sexually transmitted diseases), if you're at risk.   Talk to your provider about how often to have your cholesterol checked.  If you are at risk for diabetes, you should have a diabetes test (fasting glucose).  Shots: Get a flu shot each year. Get a tetanus shot every 10 years.   Nutrition:   Eat at least 5 servings of fruits and vegetables each day.  Eat whole-grain bread, whole-wheat pasta and brown rice instead of white grains and rice.  Get adequate Calcium and Vitamin D.     Lifestyle  Exercise at least 150 minutes a week (30 minutes a day, 5 days of the week). This will help you control your weight and prevent disease.  Limit alcohol to one drink per day.  No smoking.   Wear sunscreen to prevent skin cancer.  See your dentist every six months for an exam and cleaning.     No

## 2025-02-18 ENCOUNTER — OFFICE VISIT (OUTPATIENT)
Dept: OBGYN | Facility: CLINIC | Age: 40
End: 2025-02-18
Payer: COMMERCIAL

## 2025-02-18 VITALS
BODY MASS INDEX: 25.7 KG/M2 | HEIGHT: 69 IN | HEART RATE: 63 BPM | OXYGEN SATURATION: 98 % | WEIGHT: 173.5 LBS | DIASTOLIC BLOOD PRESSURE: 84 MMHG | SYSTOLIC BLOOD PRESSURE: 131 MMHG

## 2025-02-18 DIAGNOSIS — Z87.42 HISTORY OF OVARIAN CYST: Primary | ICD-10-CM

## 2025-02-18 PROCEDURE — 99214 OFFICE O/P EST MOD 30 MIN: CPT | Performed by: OBSTETRICS & GYNECOLOGY

## 2025-02-18 RX ORDER — DESOGESTREL AND ETHINYL ESTRADIOL 0.15-0.03
1 KIT ORAL DAILY
Qty: 84 TABLET | Refills: 4 | Status: SHIPPED | OUTPATIENT
Start: 2025-02-18 | End: 2026-02-18

## 2025-02-18 ASSESSMENT — PATIENT HEALTH QUESTIONNAIRE - PHQ9
10. IF YOU CHECKED OFF ANY PROBLEMS, HOW DIFFICULT HAVE THESE PROBLEMS MADE IT FOR YOU TO DO YOUR WORK, TAKE CARE OF THINGS AT HOME, OR GET ALONG WITH OTHER PEOPLE: NOT DIFFICULT AT ALL
SUM OF ALL RESPONSES TO PHQ QUESTIONS 1-9: 5
SUM OF ALL RESPONSES TO PHQ QUESTIONS 1-9: 5

## 2025-02-18 NOTE — PROGRESS NOTES
"[facility]- OBGYN    CC: AUB on nuvaring    S:April Toure is a 39 year old  who presents today for abnormal bleeding on nuvaring.  Patient was previously seen 2024 with ovarian cyst pain.  Plan was to start continuous nuvaring use for ovulatory suppression and period management.  She states she started the Nuvaring in October with q21 day nuvaring change for continuous use.  She initially did not have a period with the first and second nuvaring.  Then during her second nuvaring exchange she had 21 days of bleeding.  This started light and then became heavier.  When she switched her ring again she had 21 days of no bleeding.  With her switch in . She again had slow brown spotting lasting many days.  She also notes with nuvaring she is experiencing very big emotional swings.  She states her whole life she has had heavy/ crampy periods with some emotional symptoms.  Her current emotional swings are much more intense than ever before.      OBGYN Hx:   OB History    Para Term  AB Living   0 0 0 0 0 0   SAB IAB Ectopic Multiple Live Births   0 0 0 0 0       Patient's last menstrual period was 02/15/2025 (exact date).  Menses: see HPI  O: Patient Vitals for the past 24 hrs:   BP Pulse SpO2 Height Weight   25 0915 131/84 63 98 % 1.753 m (5' 9\") 78.7 kg (173 lb 8 oz)   ]  Exam:  General- awake, alert, answering questions appropriately, tearful at times during our discussion  CV- regular rate  Lung- breathing comfortably on room air    A&P: April Toure is a 39 year old  who presents today for abnormal bleeding and mood swings on nuvaring.     (Z87.42) History of ovarian cyst  (primary encounter diagnosis)  Comment: Goals with nuvaring included ovulatory suppression and cycle regulation.  Current treatment with nuvaring is not meeting goals.  We discussed alternative options including POPs, OCPs, depo provera injection.  Following our discussion patient opted to proceed with " OCPs.  Will trial for 2-3 months and touch base regarding efficacy treating symptoms.   Plan: desogestrel-ethinyl estradiol (APRI) 0.15-30         MG-MCG tablet    Isidra Panchal MD    A total of 30 minutes was spent on 2/18/25 reviewing records, discussion with patient, counseling patient, and on documentation.

## 2025-05-13 ENCOUNTER — E-VISIT (OUTPATIENT)
Dept: FAMILY MEDICINE | Facility: CLINIC | Age: 40
End: 2025-05-13
Payer: COMMERCIAL

## 2025-05-13 DIAGNOSIS — R51.9 INTRACTABLE HEADACHE, UNSPECIFIED CHRONICITY PATTERN, UNSPECIFIED HEADACHE TYPE: Primary | ICD-10-CM

## 2025-05-13 PROCEDURE — 99207 PR NON-BILLABLE SERV PER CHARTING: CPT | Performed by: PHYSICIAN ASSISTANT

## 2025-05-13 NOTE — PATIENT INSTRUCTIONS
Thank you for choosing us for your care. I think an in-clinic or virtual visit would be the best next step based on your symptoms. Please schedule a clinic appointment; you won t be charged for this eVisit.      You can schedule an appointment by clicking here in [place], or call [phone].

## 2025-05-15 ENCOUNTER — OFFICE VISIT (OUTPATIENT)
Dept: FAMILY MEDICINE | Facility: CLINIC | Age: 40
End: 2025-05-15
Payer: COMMERCIAL

## 2025-05-15 VITALS
RESPIRATION RATE: 21 BRPM | OXYGEN SATURATION: 99 % | DIASTOLIC BLOOD PRESSURE: 80 MMHG | BODY MASS INDEX: 25.74 KG/M2 | TEMPERATURE: 97.7 F | SYSTOLIC BLOOD PRESSURE: 110 MMHG | HEIGHT: 69 IN | HEART RATE: 86 BPM | WEIGHT: 173.8 LBS

## 2025-05-15 DIAGNOSIS — F51.01 PRIMARY INSOMNIA: ICD-10-CM

## 2025-05-15 DIAGNOSIS — Z87.42 HISTORY OF OVARIAN CYST: Primary | ICD-10-CM

## 2025-05-15 DIAGNOSIS — G44.211 INTRACTABLE EPISODIC TENSION-TYPE HEADACHE: ICD-10-CM

## 2025-05-15 RX ORDER — NORETHINDRONE ACETATE AND ETHINYL ESTRADIOL .02; 1 MG/1; MG/1
1 TABLET ORAL DAILY
Qty: 84 TABLET | Refills: 4 | Status: SHIPPED | OUTPATIENT
Start: 2025-05-15

## 2025-05-15 RX ORDER — TRAZODONE HYDROCHLORIDE 50 MG/1
25-100 TABLET ORAL
Qty: 60 TABLET | Refills: 3 | Status: SHIPPED | OUTPATIENT
Start: 2025-05-15

## 2025-05-15 ASSESSMENT — PAIN SCALES - GENERAL: PAINLEVEL_OUTOF10: NO PAIN (0)

## 2025-05-15 NOTE — PROGRESS NOTES
"  Assessment & Plan     Assessment & Plan  History of ovarian cyst:  Intractable episodic tension-type headache:  - Cystic pain has been better on the current birth control pill. Concerns about cyst rupture and associated pain.  - Try a lower dose estrogen pill to see if it reduces headaches while monitoring cystic pain. Discuss with Dr. Panchal about the possibility and implications of a hysterectomy.    Primary insomnia:  - Refill of trazodone prescription.    BMI  Estimated body mass index is 25.67 kg/m  as calculated from the following:    Height as of this encounter: 1.753 m (5' 9\").    Weight as of this encounter: 78.8 kg (173 lb 12.8 oz).     The longitudinal plan of care for the diagnosis(es)/condition(s) as documented were addressed during this visit. Due to the added complexity in care, I will continue to support April in the subsequent management and with ongoing continuity of care.      Subjective   April is a 39 year old, presenting for the following health issues:  Follow Up (Headache/Started with a migraine last month(concentrated on the RT side of head and over the ear-felt different from other headaches) on and off for 1-2 wks. Same thing is going on this month. /May be due to BC medication/Woke up to random bruises on the foot(no pain))    History of Present Illness-  April Toure, 39-year-old female    - Experienced regular headaches starting in March, coinciding with changes in birth control.  - Headaches described as feeling different, more nervy, primarily on the right side, starting in her neck and spreading over the ear and into temple and right eye  - Headaches have been on and off, with few days relief before another few days of pain  - Previously tried the ring for birth control, which resulted in month-long periods and severe emotional symptoms. No headaches.  - Has been cautious with NSAID use, taking ibuprofen or aspirin for headaches, ensuring to take them with food and " "water.  - Recent eye doctor appointment showed no issues related to headaches.    History of Present Illness       Headaches:   Since the patient's last clinic visit, headaches are: improved  The patient is getting headaches:  Frequent. On and off over 1 1/2 to two weeks  She is able to do normal daily activities when she has a migraine.  The patient is taking the following rescue/relief medications:  Ibuprofen (Advil, Motrin), Tylenol and Excedrin   Patient states \"The relief is inconsistent\" from the rescue/relief medications.   The patient is taking the following medications to prevent migraines:  No medications to prevent migraines  In the past 4 weeks, the patient has gone to an Urgent Care or Emergency Room 0 times times due to headaches.   She is taking medications regularly.         Objective    /80 (BP Location: Right arm, Patient Position: Sitting, Cuff Size: Adult Regular)   Pulse 86   Temp 97.7  F (36.5  C) (Temporal)   Resp 21   Ht 1.753 m (5' 9\")   Wt 78.8 kg (173 lb 12.8 oz)   LMP  (LMP Unknown)   SpO2 99%   BMI 25.67 kg/m    Body mass index is 25.67 kg/m .  Physical Exam   GENERAL: alert and no distress  PSYCH: mentation appears normal, affect normal/bright          Signed Electronically by: Sophie Okeefe PA-C    "

## 2025-05-28 ENCOUNTER — E-VISIT (OUTPATIENT)
Dept: FAMILY MEDICINE | Facility: CLINIC | Age: 40
End: 2025-05-28
Payer: COMMERCIAL

## 2025-05-28 DIAGNOSIS — G44.209 ACUTE NON INTRACTABLE TENSION-TYPE HEADACHE: Primary | ICD-10-CM

## 2025-06-03 ENCOUNTER — OFFICE VISIT (OUTPATIENT)
Dept: OBGYN | Facility: CLINIC | Age: 40
End: 2025-06-03
Payer: COMMERCIAL

## 2025-06-03 VITALS
HEART RATE: 85 BPM | BODY MASS INDEX: 25.98 KG/M2 | SYSTOLIC BLOOD PRESSURE: 131 MMHG | OXYGEN SATURATION: 100 % | DIASTOLIC BLOOD PRESSURE: 78 MMHG | HEIGHT: 69 IN | WEIGHT: 175.4 LBS

## 2025-06-03 DIAGNOSIS — Z87.42 HISTORY OF HEAVY PERIODS: ICD-10-CM

## 2025-06-03 DIAGNOSIS — Z87.42 HISTORY OF OVARIAN CYST: Primary | ICD-10-CM

## 2025-06-03 DIAGNOSIS — Z12.31 ENCOUNTER FOR SCREENING MAMMOGRAM FOR BREAST CANCER: ICD-10-CM

## 2025-06-03 DIAGNOSIS — Z87.42 HISTORY OF DYSMENORRHEA: ICD-10-CM

## 2025-06-03 PROCEDURE — 99215 OFFICE O/P EST HI 40 MIN: CPT | Performed by: OBSTETRICS & GYNECOLOGY

## 2025-06-03 PROCEDURE — 3075F SYST BP GE 130 - 139MM HG: CPT | Performed by: OBSTETRICS & GYNECOLOGY

## 2025-06-03 PROCEDURE — 3078F DIAST BP <80 MM HG: CPT | Performed by: OBSTETRICS & GYNECOLOGY

## 2025-06-03 NOTE — PROGRESS NOTES
"[facility]- OBGYN    CC:discuss options    S:April Toure is a 39 year old  who presents today for discussion of options, currently with worsening headaches on birth control pill.  Patient has a notable history of heavy painful periods, ovarian cysts, and headaches.  She was previously treated with nuvaring without significant bleeding improvement.  Started on OCP 25 with 30mcg of daily estrogen.  She noted that she was having worsening headaches with this medication.  It started the second pill pack.  The headache would start to get worse during the second week of the pill pack (last month and this month).  She feels the headache is the intensity of a migraine, but the character is a bit different.  It is worse on the right side and when it gets very intense she experiences teeth pain and eye pain.  Her usual migraine is more like a tension headache and starts and the back of both sides of her head and moves forward.  She has taken aleve which helps some for the headache.  ASA at times helps.  She does have Excedrin migraine in her purse today.  This month she did have some pelvic \"twinges\" and then light breakthrough bleeding.  It was enough bleeding to need a pad, but not as heavy as a period.    Patient also shares she has an elderly cat that is ill.  This is a major stressor for her right now.      Patient saw PCP Sophie Okeefe on 5/15 /25 with headache issues.  Potentially estrogen related.  Switched birth control pill to a lower estrogen dose (20mcg daily).  Patient feels like she is having the same headache issue even with this switch.  She was advised to make visit with me because she asked if she can remove the uterus and ovaries since she doesn't plan to have children.      O: Patient Vitals for the past 24 hrs:   BP Pulse SpO2 Height Weight   25 0955 131/78 85 100 % 1.753 m (5' 9\") 79.6 kg (175 lb 6.4 oz)   ]  Exam:  General- awake, alert, answering questions appropriately, " tearful at times during our discussion    A&P:     (Z87.42) History of ovarian cyst  (primary encounter diagnosis)  (Z87.42) History of dysmenorrhea  (Z87.42) History of heavy periods  Comment: Patient has tried nuvaring and two different doses of SOLEDAD for ovulatory/menstrual suppression.  Nuvaring did not meet goals for bleeding control.  SOLEDAD with worsening headache side effects.  Discussed with patient additional medical options.  These include progesterone only pill, depo provera injection, Mirena IUD, and Nexplanon.  Discussed Mirena IUD does not reliably suppress ovulation, so would not be best to prevent development of ovarian cysts with ovulation.  Nexplanon has a variable bleeding profile, so would not be best for predictable menstrual bleeding.  Options to strongly consider include progesterone only pill and depo provera injection.  The depo provera injection is most likely to have the best ovulatory suppression and a good chance of light bleeding for menstrual control.  We did discuss surgical options.  I explained in patients <65 years old the ovaries continue to have a benefit for cardiovascular and bone health.  In patients who oophorectomy is indicated, ACOG recommends premenopausal age supplementation of estrogen and progesterone for bone and cardiovascular health.  Thus, if this patient were to undergo hysterectomy and bilateral oophorectomy (which I would not recommend at this time), the standard of care would be placing her on an OCP until age 50.  Explained that removal of ovaries would cause immediate surgical menopause.  She is not tolerating her current OCP due to headaches, so this is not a good option.  Explained hysterectomy can be performed for heavy painful periods that are not adequately controlled with medical management options.  If ovaries appear normal I would advise leaving them in.  Explained with the ovaries intact there is still the risk of development of ovarian cysts and often  patient continue medications to suppress ovulation.  I offered to explain in more detail the surgical steps and expectations of a hysterectomy.  Patient feels her ability to make decisions right now is not optimal because of her current life stressors.  She feels it is too much to discuss surgical details at this time.       Plan: Patient plans to think about options and I ask that she message me by the end of the week to update me on what she is planning.  If she opts for depo provera I can order and she can come for an RN only injection visit.  If she opts for POP I can send script to pharmacy.      (Z12.31) Encounter for screening mammogram for breast cancer  Comment: Patient will be 40 this month.  Plan for screening mammogram  Plan: MA Screen Bilateral w/Long Panchal MD    A total of 51 minutes on 6/3/25 was spent on chart review, discussion with patient, patient counseling, and on documentation.

## 2025-06-04 ENCOUNTER — PATIENT OUTREACH (OUTPATIENT)
Dept: CARE COORDINATION | Facility: CLINIC | Age: 40
End: 2025-06-04
Payer: COMMERCIAL

## 2025-07-11 ENCOUNTER — HOSPITAL ENCOUNTER (OUTPATIENT)
Dept: MAMMOGRAPHY | Facility: CLINIC | Age: 40
Discharge: HOME | End: 2025-07-11
Attending: OBSTETRICS & GYNECOLOGY | Admitting: OBSTETRICS & GYNECOLOGY
Payer: COMMERCIAL

## 2025-07-11 DIAGNOSIS — Z12.31 ENCOUNTER FOR SCREENING MAMMOGRAM FOR BREAST CANCER: ICD-10-CM

## 2025-07-11 PROCEDURE — 77063 BREAST TOMOSYNTHESIS BI: CPT

## 2025-07-18 ENCOUNTER — HOSPITAL ENCOUNTER (OUTPATIENT)
Dept: MAMMOGRAPHY | Facility: CLINIC | Age: 40
Discharge: HOME | End: 2025-07-18
Attending: OBSTETRICS & GYNECOLOGY
Payer: COMMERCIAL

## 2025-07-18 DIAGNOSIS — R92.8 ABNORMAL MAMMOGRAM: ICD-10-CM

## 2025-07-18 PROCEDURE — 76642 ULTRASOUND BREAST LIMITED: CPT | Mod: RT

## 2025-07-18 PROCEDURE — 77065 DX MAMMO INCL CAD UNI: CPT | Mod: RT

## 2025-07-23 ENCOUNTER — HOSPITAL ENCOUNTER (OUTPATIENT)
Dept: MAMMOGRAPHY | Facility: CLINIC | Age: 40
Discharge: HOME | End: 2025-07-23
Attending: OBSTETRICS & GYNECOLOGY
Payer: COMMERCIAL

## 2025-07-23 DIAGNOSIS — R92.8 ABNORMAL MAMMOGRAM: ICD-10-CM

## 2025-07-23 PROCEDURE — 250N000009 HC RX 250: Performed by: OBSTETRICS & GYNECOLOGY

## 2025-07-23 PROCEDURE — 88305 TISSUE EXAM BY PATHOLOGIST: CPT | Mod: TC | Performed by: OBSTETRICS & GYNECOLOGY

## 2025-07-23 PROCEDURE — 999N000065 MA POST PROCEDURE RIGHT

## 2025-07-23 PROCEDURE — 272N000615 US BREAST BIOPSY CORE NEEDLE RIGHT

## 2025-07-23 RX ADMIN — LIDOCAINE HYDROCHLORIDE 5 ML: 10 INJECTION, SOLUTION INFILTRATION; PERINEURAL at 10:38

## 2025-07-23 NOTE — DISCHARGE INSTRUCTIONS
After Your Breast Biopsy  Bleeding, bruising, and pain  Breast tenderness and some bruising is normal and may last several days. You may wear your bra overnight to support the breast.  You may use an ice pack for pain. Place it over the area for 15 to 20 minutes, several times a day.  You may take over-the-counter pain medicine:  On the day of the biopsy, we recommend Tylenol (acetaminophen) because it does not raise your risk of bleeding.  The next day, you may take an anti-inflammatory medicine (aspirin, ibuprofen, Motrin, Aleve, Advil), unless your doctor tells you not to.  Bandages and showering  Keep your bandage in place until tomorrow morning. Don't get it wet.  If you have small pieces of tape on the skin, leave them in place. They will fall off on their own, or you can remove them after 5 days.  You may shower the next morning after your biopsy.  Activity  No heavy activity (no running, no gym workouts, no lifting, no vacuuming, etc.) on the day of your biopsy.  You may go back to normal activity the next day. But limit what you do if you still have pain or discomfort.  Infection  Infection is rare. Signs of infection include:  Fever (including sweats and chills)  Redness  Pain that gets worse  Fluid draining from the biopsy site  Biopsy results  Results may take up to 5 business days.  A nurse or doctor from the Breast Center will call with your results. We will also send the results to the doctor that ordered your biopsy.  If you have not gotten your results in 5 days, please call the Breast Center.  Call the Breast Center with questions or if:   You have bleeding that lasts more than 20 minutes.  You have pain that you can't control.  You have signs of infection (fever, sweats, chills, redness, increasing pain, or drainage).  After hours, please call the doctor who ordered your biopsy.  For informational purposes only. Not to replace the advice of your health care provider. Copyright   2010 [facility]. All rights reserved. Clinically reviewed by Kylie Ross, Director, [facility]. [employer] [id number] - REV 08/23.

## 2025-07-24 ENCOUNTER — TELEPHONE (OUTPATIENT)
Dept: MAMMOGRAPHY | Facility: CLINIC | Age: 40
End: 2025-07-24
Payer: COMMERCIAL

## 2025-07-24 LAB
PATH REPORT.COMMENTS IMP SPEC: NORMAL
PATH REPORT.COMMENTS IMP SPEC: NORMAL
PATH REPORT.FINAL DX SPEC: NORMAL
PATH REPORT.GROSS SPEC: NORMAL
PATH REPORT.MICROSCOPIC SPEC OTHER STN: NORMAL
PHOTO IMAGE: NORMAL

## 2025-07-24 NOTE — TELEPHONE ENCOUNTER
Call placed to April.   verified.     April was notified that pathology results from her 2025 RIGHT BREAST BIOPSY revealed:     [facility]  April Toure [id number]  F, 1985  Surgical Pathology Report (Final result) [id number]  Authorizing Provider: Isidra Panchal MD Ordering Provider: Isidra Panchal MD  Ordering Location: [facility]  Collected: 2025 10:39 AM  Pathologist: Brian Amin MD Received: 2025 11:16 AM  .  Specimens  A Breast, Right, Breast Biopsy Needle  .  .  Final Diagnosis  A. Breast, right, 10:00, 10 cm from nipple, 1.3 cm size, needle core biopsy:  - Fibroadenoma with focal usual ductal hyperplasia.  Electronically signed by Brian Amin MD on 2025 at 1438 CDT     Per [facility] Radiologist, Dr. Wm Contreras, results are concordant with imaging findings.     Recommendation: Annual Screening Mammograms     April denies any concerns with the biopsy site. Ordering provider was informed of the results and follow up plan.  I encouraged her to contact her doctor with any further breast concerns.  Patient verbalized understanding and agrees with the plan of care.        Elida Saenz RN BSN  Procedure Nurse  [facility] [place]  [phone]

## 2025-08-18 ENCOUNTER — OFFICE VISIT (OUTPATIENT)
Dept: MIDWIFE SERVICES | Facility: CLINIC | Age: 40
End: 2025-08-18
Payer: COMMERCIAL

## 2025-08-18 VITALS
WEIGHT: 182.9 LBS | HEART RATE: 70 BPM | SYSTOLIC BLOOD PRESSURE: 113 MMHG | DIASTOLIC BLOOD PRESSURE: 74 MMHG | OXYGEN SATURATION: 100 % | HEIGHT: 69 IN | BODY MASS INDEX: 27.09 KG/M2

## 2025-08-18 DIAGNOSIS — Z30.430 ENCOUNTER FOR IUD INSERTION: Primary | ICD-10-CM

## 2025-08-18 DIAGNOSIS — Z30.430 ENCOUNTER FOR INSERTION OF INTRAUTERINE CONTRACEPTIVE DEVICE: ICD-10-CM

## 2025-08-18 PROCEDURE — 3074F SYST BP LT 130 MM HG: CPT | Performed by: ADVANCED PRACTICE MIDWIFE

## 2025-08-18 PROCEDURE — 3078F DIAST BP <80 MM HG: CPT | Performed by: ADVANCED PRACTICE MIDWIFE

## 2025-08-18 PROCEDURE — 58300 INSERT INTRAUTERINE DEVICE: CPT | Performed by: ADVANCED PRACTICE MIDWIFE
